# Patient Record
Sex: FEMALE | Race: WHITE | NOT HISPANIC OR LATINO
[De-identification: names, ages, dates, MRNs, and addresses within clinical notes are randomized per-mention and may not be internally consistent; named-entity substitution may affect disease eponyms.]

---

## 2018-02-14 ENCOUNTER — TRANSCRIPTION ENCOUNTER (OUTPATIENT)
Age: 48
End: 2018-02-14

## 2018-02-26 ENCOUNTER — TRANSCRIPTION ENCOUNTER (OUTPATIENT)
Age: 48
End: 2018-02-26

## 2019-02-22 ENCOUNTER — TRANSCRIPTION ENCOUNTER (OUTPATIENT)
Age: 49
End: 2019-02-22

## 2019-03-12 ENCOUNTER — TRANSCRIPTION ENCOUNTER (OUTPATIENT)
Age: 49
End: 2019-03-12

## 2019-11-11 ENCOUNTER — TRANSCRIPTION ENCOUNTER (OUTPATIENT)
Age: 49
End: 2019-11-11

## 2020-07-20 ENCOUNTER — TRANSCRIPTION ENCOUNTER (OUTPATIENT)
Age: 50
End: 2020-07-20

## 2020-07-28 ENCOUNTER — TRANSCRIPTION ENCOUNTER (OUTPATIENT)
Age: 50
End: 2020-07-28

## 2021-02-22 ENCOUNTER — INPATIENT (INPATIENT)
Facility: HOSPITAL | Age: 51
LOS: 3 days | Discharge: HOME | End: 2021-02-26
Attending: INTERNAL MEDICINE | Admitting: INTERNAL MEDICINE
Payer: MEDICAID

## 2021-02-22 VITALS
HEIGHT: 64 IN | HEART RATE: 110 BPM | RESPIRATION RATE: 19 BRPM | OXYGEN SATURATION: 100 % | WEIGHT: 139.99 LBS | DIASTOLIC BLOOD PRESSURE: 41 MMHG | SYSTOLIC BLOOD PRESSURE: 82 MMHG | TEMPERATURE: 95 F

## 2021-02-22 LAB
ALBUMIN SERPL ELPH-MCNC: 2.9 G/DL — LOW (ref 3.3–5)
ALP SERPL-CCNC: 38 U/L — LOW (ref 40–120)
ALT FLD-CCNC: 20 U/L — SIGNIFICANT CHANGE UP (ref 12–78)
AMMONIA BLD-MCNC: 26 UMOL/L — SIGNIFICANT CHANGE UP (ref 11–32)
ANION GAP SERPL CALC-SCNC: 3 MMOL/L — LOW (ref 5–17)
APTT BLD: 25.3 SEC — LOW (ref 27.5–35.5)
AST SERPL-CCNC: 15 U/L — SIGNIFICANT CHANGE UP (ref 15–37)
BASOPHILS # BLD AUTO: 0.08 K/UL — SIGNIFICANT CHANGE UP (ref 0–0.2)
BASOPHILS NFR BLD AUTO: 0.9 % — SIGNIFICANT CHANGE UP (ref 0–2)
BILIRUB SERPL-MCNC: 0.3 MG/DL — SIGNIFICANT CHANGE UP (ref 0.2–1.2)
BUN SERPL-MCNC: 10 MG/DL — SIGNIFICANT CHANGE UP (ref 7–23)
CALCIUM SERPL-MCNC: 7.8 MG/DL — LOW (ref 8.5–10.1)
CHLORIDE SERPL-SCNC: 114 MMOL/L — HIGH (ref 96–108)
CO2 SERPL-SCNC: 27 MMOL/L — SIGNIFICANT CHANGE UP (ref 22–31)
CREAT SERPL-MCNC: 0.74 MG/DL — SIGNIFICANT CHANGE UP (ref 0.5–1.3)
EOSINOPHIL # BLD AUTO: 0.08 K/UL — SIGNIFICANT CHANGE UP (ref 0–0.5)
EOSINOPHIL NFR BLD AUTO: 0.9 % — SIGNIFICANT CHANGE UP (ref 0–6)
ETHANOL SERPL-MCNC: <10 MG/DL — SIGNIFICANT CHANGE UP (ref 0–10)
GLUCOSE BLDC GLUCOMTR-MCNC: 140 MG/DL — HIGH (ref 70–99)
GLUCOSE BLDC GLUCOMTR-MCNC: 179 MG/DL — HIGH (ref 70–99)
GLUCOSE BLDC GLUCOMTR-MCNC: 201 MG/DL — HIGH (ref 70–99)
GLUCOSE BLDC GLUCOMTR-MCNC: 23 MG/DL — CRITICAL LOW (ref 70–99)
GLUCOSE BLDC GLUCOMTR-MCNC: 266 MG/DL — HIGH (ref 70–99)
GLUCOSE BLDC GLUCOMTR-MCNC: 309 MG/DL — HIGH (ref 70–99)
GLUCOSE BLDC GLUCOMTR-MCNC: 40 MG/DL — CRITICAL LOW (ref 70–99)
GLUCOSE BLDC GLUCOMTR-MCNC: 42 MG/DL — CRITICAL LOW (ref 70–99)
GLUCOSE BLDC GLUCOMTR-MCNC: 52 MG/DL — CRITICAL LOW (ref 70–99)
GLUCOSE BLDC GLUCOMTR-MCNC: 81 MG/DL — SIGNIFICANT CHANGE UP (ref 70–99)
GLUCOSE BLDC GLUCOMTR-MCNC: 91 MG/DL — SIGNIFICANT CHANGE UP (ref 70–99)
GLUCOSE SERPL-MCNC: 32 MG/DL — CRITICAL LOW (ref 70–99)
HCG SERPL-ACNC: <1 MIU/ML — SIGNIFICANT CHANGE UP
HCT VFR BLD CALC: 36.6 % — SIGNIFICANT CHANGE UP (ref 34.5–45)
HGB BLD-MCNC: 11.8 G/DL — SIGNIFICANT CHANGE UP (ref 11.5–15.5)
IMM GRANULOCYTES NFR BLD AUTO: 0.2 % — SIGNIFICANT CHANGE UP (ref 0–1.5)
INR BLD: 1.02 RATIO — SIGNIFICANT CHANGE UP (ref 0.88–1.16)
LACTATE SERPL-SCNC: 2 MMOL/L — SIGNIFICANT CHANGE UP (ref 0.7–2)
LACTATE SERPL-SCNC: 2.9 MMOL/L — HIGH (ref 0.7–2)
LYMPHOCYTES # BLD AUTO: 0.97 K/UL — LOW (ref 1–3.3)
LYMPHOCYTES # BLD AUTO: 10.7 % — LOW (ref 13–44)
MAGNESIUM SERPL-MCNC: 1.6 MG/DL — SIGNIFICANT CHANGE UP (ref 1.6–2.6)
MCHC RBC-ENTMCNC: 29.6 PG — SIGNIFICANT CHANGE UP (ref 27–34)
MCHC RBC-ENTMCNC: 32.2 GM/DL — SIGNIFICANT CHANGE UP (ref 32–36)
MCV RBC AUTO: 92 FL — SIGNIFICANT CHANGE UP (ref 80–100)
MONOCYTES # BLD AUTO: 1.01 K/UL — HIGH (ref 0–0.9)
MONOCYTES NFR BLD AUTO: 11.2 % — SIGNIFICANT CHANGE UP (ref 2–14)
NEUTROPHILS # BLD AUTO: 6.88 K/UL — SIGNIFICANT CHANGE UP (ref 1.8–7.4)
NEUTROPHILS NFR BLD AUTO: 76.1 % — SIGNIFICANT CHANGE UP (ref 43–77)
NRBC # BLD: 0 /100 WBCS — SIGNIFICANT CHANGE UP (ref 0–0)
PLATELET # BLD AUTO: 295 K/UL — SIGNIFICANT CHANGE UP (ref 150–400)
POTASSIUM SERPL-MCNC: 3.9 MMOL/L — SIGNIFICANT CHANGE UP (ref 3.5–5.3)
POTASSIUM SERPL-SCNC: 3.9 MMOL/L — SIGNIFICANT CHANGE UP (ref 3.5–5.3)
PROT SERPL-MCNC: 5.9 GM/DL — LOW (ref 6–8.3)
PROTHROM AB SERPL-ACNC: 11.8 SEC — SIGNIFICANT CHANGE UP (ref 10.6–13.6)
RAPID RVP RESULT: SIGNIFICANT CHANGE UP
RBC # BLD: 3.98 M/UL — SIGNIFICANT CHANGE UP (ref 3.8–5.2)
RBC # FLD: 14.3 % — SIGNIFICANT CHANGE UP (ref 10.3–14.5)
SARS-COV-2 RNA SPEC QL NAA+PROBE: SIGNIFICANT CHANGE UP
SODIUM SERPL-SCNC: 144 MMOL/L — SIGNIFICANT CHANGE UP (ref 135–145)
TROPONIN I SERPL-MCNC: <.015 NG/ML — SIGNIFICANT CHANGE UP (ref 0.01–0.04)
WBC # BLD: 9.04 K/UL — SIGNIFICANT CHANGE UP (ref 3.8–10.5)
WBC # FLD AUTO: 9.04 K/UL — SIGNIFICANT CHANGE UP (ref 3.8–10.5)

## 2021-02-22 PROCEDURE — 99291 CRITICAL CARE FIRST HOUR: CPT

## 2021-02-22 PROCEDURE — 93010 ELECTROCARDIOGRAM REPORT: CPT

## 2021-02-22 PROCEDURE — 73030 X-RAY EXAM OF SHOULDER: CPT | Mod: 26,LT

## 2021-02-22 PROCEDURE — 71045 X-RAY EXAM CHEST 1 VIEW: CPT | Mod: 26

## 2021-02-22 PROCEDURE — 99223 1ST HOSP IP/OBS HIGH 75: CPT

## 2021-02-22 PROCEDURE — 70450 CT HEAD/BRAIN W/O DYE: CPT | Mod: 26,MD

## 2021-02-22 RX ORDER — DEXTROSE 50 % IN WATER 50 %
50 SYRINGE (ML) INTRAVENOUS ONCE
Refills: 0 | Status: DISCONTINUED | OUTPATIENT
Start: 2021-02-22 | End: 2021-02-22

## 2021-02-22 RX ORDER — VENLAFAXINE HCL 75 MG
150 CAPSULE, EXT RELEASE 24 HR ORAL DAILY
Refills: 0 | Status: DISCONTINUED | OUTPATIENT
Start: 2021-02-22 | End: 2021-02-26

## 2021-02-22 RX ORDER — SODIUM CHLORIDE 9 MG/ML
1000 INJECTION INTRAMUSCULAR; INTRAVENOUS; SUBCUTANEOUS ONCE
Refills: 0 | Status: COMPLETED | OUTPATIENT
Start: 2021-02-22 | End: 2021-02-22

## 2021-02-22 RX ORDER — DEXTROSE 50 % IN WATER 50 %
25 SYRINGE (ML) INTRAVENOUS ONCE
Refills: 0 | Status: DISCONTINUED | OUTPATIENT
Start: 2021-02-22 | End: 2021-02-26

## 2021-02-22 RX ORDER — THIAMINE MONONITRATE (VIT B1) 100 MG
100 TABLET ORAL ONCE
Refills: 0 | Status: COMPLETED | OUTPATIENT
Start: 2021-02-22 | End: 2021-02-22

## 2021-02-22 RX ORDER — SODIUM CHLORIDE 9 MG/ML
1000 INJECTION INTRAMUSCULAR; INTRAVENOUS; SUBCUTANEOUS ONCE
Refills: 0 | Status: DISCONTINUED | OUTPATIENT
Start: 2021-02-22 | End: 2021-02-22

## 2021-02-22 RX ORDER — LEVOTHYROXINE SODIUM 125 MCG
75 TABLET ORAL DAILY
Refills: 0 | Status: DISCONTINUED | OUTPATIENT
Start: 2021-02-22 | End: 2021-02-26

## 2021-02-22 RX ORDER — DEXTROSE 50 % IN WATER 50 %
50 SYRINGE (ML) INTRAVENOUS ONCE
Refills: 0 | Status: COMPLETED | OUTPATIENT
Start: 2021-02-22 | End: 2021-02-22

## 2021-02-22 RX ORDER — FOLIC ACID 0.8 MG
1 TABLET ORAL ONCE
Refills: 0 | Status: COMPLETED | OUTPATIENT
Start: 2021-02-22 | End: 2021-02-22

## 2021-02-22 RX ORDER — SODIUM CHLORIDE 9 MG/ML
1000 INJECTION, SOLUTION INTRAVENOUS
Refills: 0 | Status: DISCONTINUED | OUTPATIENT
Start: 2021-02-22 | End: 2021-02-26

## 2021-02-22 RX ORDER — INSULIN LISPRO 100/ML
VIAL (ML) SUBCUTANEOUS
Refills: 0 | Status: DISCONTINUED | OUTPATIENT
Start: 2021-02-22 | End: 2021-02-26

## 2021-02-22 RX ORDER — SODIUM CHLORIDE 9 MG/ML
1000 INJECTION INTRAMUSCULAR; INTRAVENOUS; SUBCUTANEOUS
Refills: 0 | Status: DISCONTINUED | OUTPATIENT
Start: 2021-02-22 | End: 2021-02-23

## 2021-02-22 RX ORDER — INSULIN LISPRO 100/ML
VIAL (ML) SUBCUTANEOUS AT BEDTIME
Refills: 0 | Status: DISCONTINUED | OUTPATIENT
Start: 2021-02-22 | End: 2021-02-26

## 2021-02-22 RX ORDER — NICOTINE POLACRILEX 2 MG
1 GUM BUCCAL DAILY
Refills: 0 | Status: DISCONTINUED | OUTPATIENT
Start: 2021-02-22 | End: 2021-02-26

## 2021-02-22 RX ORDER — DEXTROSE 50 % IN WATER 50 %
100 SYRINGE (ML) INTRAVENOUS ONCE
Refills: 0 | Status: COMPLETED | OUTPATIENT
Start: 2021-02-22 | End: 2021-02-22

## 2021-02-22 RX ORDER — NICOTINE POLACRILEX 2 MG
4 GUM BUCCAL
Refills: 0 | Status: DISCONTINUED | OUTPATIENT
Start: 2021-02-22 | End: 2021-02-26

## 2021-02-22 RX ORDER — MAGNESIUM HYDROXIDE 400 MG/1
30 TABLET, CHEWABLE ORAL DAILY
Refills: 0 | Status: DISCONTINUED | OUTPATIENT
Start: 2021-02-22 | End: 2021-02-26

## 2021-02-22 RX ORDER — NICOTINE POLACRILEX 2 MG
4 GUM BUCCAL
Refills: 0 | Status: DISCONTINUED | OUTPATIENT
Start: 2021-02-22 | End: 2021-02-22

## 2021-02-22 RX ORDER — CALCIUM CARBONATE 500(1250)
2 TABLET ORAL
Refills: 0 | Status: DISCONTINUED | OUTPATIENT
Start: 2021-02-22 | End: 2021-02-26

## 2021-02-22 RX ORDER — DOXAZOSIN MESYLATE 4 MG
2 TABLET ORAL AT BEDTIME
Refills: 0 | Status: DISCONTINUED | OUTPATIENT
Start: 2021-02-22 | End: 2021-02-26

## 2021-02-22 RX ORDER — GABAPENTIN 400 MG/1
300 CAPSULE ORAL THREE TIMES A DAY
Refills: 0 | Status: DISCONTINUED | OUTPATIENT
Start: 2021-02-22 | End: 2021-02-26

## 2021-02-22 RX ORDER — GLUCAGON INJECTION, SOLUTION 0.5 MG/.1ML
1 INJECTION, SOLUTION SUBCUTANEOUS ONCE
Refills: 0 | Status: DISCONTINUED | OUTPATIENT
Start: 2021-02-22 | End: 2021-02-26

## 2021-02-22 RX ORDER — DEXTROSE 50 % IN WATER 50 %
15 SYRINGE (ML) INTRAVENOUS ONCE
Refills: 0 | Status: DISCONTINUED | OUTPATIENT
Start: 2021-02-22 | End: 2021-02-26

## 2021-02-22 RX ORDER — QUETIAPINE FUMARATE 200 MG/1
50 TABLET, FILM COATED ORAL AT BEDTIME
Refills: 0 | Status: DISCONTINUED | OUTPATIENT
Start: 2021-02-22 | End: 2021-02-26

## 2021-02-22 RX ORDER — INSULIN GLARGINE 100 [IU]/ML
12 INJECTION, SOLUTION SUBCUTANEOUS AT BEDTIME
Refills: 0 | Status: DISCONTINUED | OUTPATIENT
Start: 2021-02-22 | End: 2021-02-26

## 2021-02-22 RX ORDER — SODIUM CHLORIDE 9 MG/ML
1000 INJECTION, SOLUTION INTRAVENOUS
Refills: 0 | Status: DISCONTINUED | OUTPATIENT
Start: 2021-02-22 | End: 2021-02-22

## 2021-02-22 RX ORDER — SODIUM CHLORIDE 9 MG/ML
2000 INJECTION INTRAMUSCULAR; INTRAVENOUS; SUBCUTANEOUS ONCE
Refills: 0 | Status: COMPLETED | OUTPATIENT
Start: 2021-02-22 | End: 2021-02-22

## 2021-02-22 RX ORDER — SODIUM CHLORIDE 9 MG/ML
1000 INJECTION, SOLUTION INTRAVENOUS
Refills: 0 | Status: COMPLETED | OUTPATIENT
Start: 2021-02-22 | End: 2021-02-22

## 2021-02-22 RX ORDER — DEXTROSE 50 % IN WATER 50 %
12.5 SYRINGE (ML) INTRAVENOUS ONCE
Refills: 0 | Status: DISCONTINUED | OUTPATIENT
Start: 2021-02-22 | End: 2021-02-26

## 2021-02-22 RX ORDER — INSULIN LISPRO 100/ML
4 VIAL (ML) SUBCUTANEOUS
Refills: 0 | Status: DISCONTINUED | OUTPATIENT
Start: 2021-02-22 | End: 2021-02-26

## 2021-02-22 RX ADMIN — Medication 2 MILLIGRAM(S): at 22:09

## 2021-02-22 RX ADMIN — SODIUM CHLORIDE 1000 MILLILITER(S): 9 INJECTION, SOLUTION INTRAVENOUS at 13:42

## 2021-02-22 RX ADMIN — Medication 4 MILLIGRAM(S): at 22:09

## 2021-02-22 RX ADMIN — SODIUM CHLORIDE 1000 MILLILITER(S): 9 INJECTION INTRAMUSCULAR; INTRAVENOUS; SUBCUTANEOUS at 11:34

## 2021-02-22 RX ADMIN — Medication 150 MILLIGRAM(S): at 17:33

## 2021-02-22 RX ADMIN — GABAPENTIN 300 MILLIGRAM(S): 400 CAPSULE ORAL at 22:08

## 2021-02-22 RX ADMIN — QUETIAPINE FUMARATE 50 MILLIGRAM(S): 200 TABLET, FILM COATED ORAL at 22:08

## 2021-02-22 RX ADMIN — SODIUM CHLORIDE 100 MILLILITER(S): 9 INJECTION INTRAMUSCULAR; INTRAVENOUS; SUBCUTANEOUS at 15:11

## 2021-02-22 RX ADMIN — SODIUM CHLORIDE 2000 MILLILITER(S): 9 INJECTION INTRAMUSCULAR; INTRAVENOUS; SUBCUTANEOUS at 09:22

## 2021-02-22 RX ADMIN — Medication 8: at 15:49

## 2021-02-22 RX ADMIN — Medication 4 MILLIGRAM(S): at 17:58

## 2021-02-22 RX ADMIN — MAGNESIUM HYDROXIDE 30 MILLILITER(S): 400 TABLET, CHEWABLE ORAL at 22:08

## 2021-02-22 RX ADMIN — Medication 50 MILLILITER(S): at 08:18

## 2021-02-22 RX ADMIN — INSULIN GLARGINE 12 UNIT(S): 100 INJECTION, SOLUTION SUBCUTANEOUS at 22:33

## 2021-02-22 RX ADMIN — Medication 1 PATCH: at 19:54

## 2021-02-22 RX ADMIN — SODIUM CHLORIDE 70 MILLILITER(S): 9 INJECTION, SOLUTION INTRAVENOUS at 10:05

## 2021-02-22 RX ADMIN — Medication 1 MILLIGRAM(S): at 11:40

## 2021-02-22 RX ADMIN — SODIUM CHLORIDE 1000 MILLILITER(S): 9 INJECTION INTRAMUSCULAR; INTRAVENOUS; SUBCUTANEOUS at 10:05

## 2021-02-22 RX ADMIN — Medication 1 PATCH: at 14:55

## 2021-02-22 RX ADMIN — Medication 2 TABLET(S): at 18:05

## 2021-02-22 RX ADMIN — Medication 100 MILLILITER(S): at 09:59

## 2021-02-22 RX ADMIN — Medication 100 MILLIGRAM(S): at 11:40

## 2021-02-22 RX ADMIN — Medication 4 UNIT(S): at 15:50

## 2021-02-22 NOTE — ED PROVIDER NOTE - CONSTITUTIONAL, MLM
Well appearing, awake, alert, oriented to person, place, time/situation and in no apparent distress. Speaking in clear full sentences no nasal flaring no shoulders retractions no diaphoresis appears very comfortable sitting up in the stretcher in a bright light room normal...

## 2021-02-22 NOTE — ED ADULT NURSE REASSESSMENT NOTE - NS ED NURSE REASSESS COMMENT FT1
Pt BG critical as per flow. Dr. Sanchez made aware. D5 started as per flow. 2 amps OF d50 given. BQ q1hr. Repeat 179 at this time. MD Sanchez aware.

## 2021-02-22 NOTE — H&P ADULT - NSICDXPASTMEDICALHX_GEN_ALL_CORE_FT
PAST MEDICAL HISTORY:  DM (diabetes mellitus)     ETOH abuse     Hypothyroid     Raynaud disease

## 2021-02-22 NOTE — ED ADULT NURSE REASSESSMENT NOTE - NS ED NURSE REASSESS COMMENT FT1
Pt lying in bed comfortably. Updated on POC. Report given to Marline Delgadillo RN on 1D. Awaiting transportation at this time.
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
## 2021-02-22 NOTE — ED PROVIDER NOTE - PROGRESS NOTE DETAILS
ICU is notified. ICU attending Dr. Little is here eval and sts pt does not need icu admission. Dr. Benson is notified to admit pt.

## 2021-02-22 NOTE — ED PROVIDER NOTE - OBJECTIVE STATEMENT
50 years old female by ems sts pt was unresponsive with blood sugar of 30 pt received D 10 normal saline pt 's blood sugar at triage is 23 pt received dextro 50 1 amp iv pt then slowly woke up Pt is alert and oriented x 3 now sts admit drinking alcohol yesterday and did not have dinner last meal was 17:00 am pt has a hx of diabetes she sts she received Lantus 10 u this morning. Pt also c/o left shoulder pain increases with left arm movement for couple of weakness but denies trauma. Pt denies headache, dizziness, blurred visions, trauma to the head, light sensitivities, focal/distal weakness or numbness, neck/back/hips pain, cough, sob, chest pain, nausea, vomiting, fever, chills, abd pain, dysuria or irregular bowel movements.

## 2021-02-22 NOTE — ED PROVIDER NOTE - CRITICAL CARE ATTENDING CONTRIBUTION TO CARE
Pt was hypoglycemia hypotensive with alcohol withdrow. Pt was given multiple fluid but at same time the fluid makes hypoglycemia persist ICU attending was notified and eval pt.

## 2021-02-22 NOTE — ED ADULT NURSE NOTE - CHIEF COMPLAINT QUOTE
ems states, " pt with low poc in field , 20g TO left hand , d10 infusion started , poc went up to 114 , hx of etoh abuse, psych and hyperthyroidism " in traige poc 23, md echevarria  made aware , pt talking, d50 Iv  given as ordered '
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
## 2021-02-22 NOTE — H&P ADULT - HISTORY OF PRESENT ILLNESS
50 years old female brought by ems as pt was unresponsive with blood sugar of 30. Patient received D 10 normal saline pt 's blood sugar at triage is 23 pt received dextro 50 1 amp iv pt then slowly woke up Pt is alert and oriented x 3. In ER, patient admits to drinking alcohol yesterday and did not have dinner last meal was 17:00 am pt has a hx of diabetes. She received Lantus 10 u this morning. Pt also c/o left shoulder pain increases with left arm movement for couple of weakness but denies trauma. Pt denies headache, dizziness, blurred visions, trauma to the head, light sensitivities, focal/distal weakness or numbness, neck/back/hips pain, cough, sob, chest pain, nausea, vomiting. Admitted for IV glucose and ETOH withdrawal.  50 years old female Type I DM brought by ems as pt was unresponsive with blood sugar of 30. Patient received D 10 normal saline pt 's blood sugar at triage is 23 pt received dextro 50 1 amp iv pt then slowly woke up Pt is alert and oriented x 3. In ER, patient admits to drinking alcohol yesterday and did not have dinner last meal was 17:00 am pt has a hx of diabetes. She received Lantus 10 u this morning. Pt denies headache, dizziness, blurred visions, trauma to the head, light sensitivities, focal/distal weakness or numbness, neck/back/hips pain, cough, sob, chest pain, nausea, vomiting.     Admitted for IV glucose and blood sugar management.  50 years old female Type I DM brought by ems as pt was unresponsive with blood sugar of 30. Patient received D 10 normal saline pt 's blood sugar at triage is 23 pt received dextro 50 1 amp iv pt then slowly woke up Pt is alert and oriented x 3.     In ER, patient admits to drinking alcohol yesterday and did not have dinner last meal was 17:00. Has a hx of Type I diabetes. She received no Lantus this AM only Humolog. Pt denies headache, dizziness, blurred visions, trauma to the head, light sensitivities, focal/distal weakness or numbness, neck/back/hips pain, cough, sob, chest pain, nausea, vomiting.     Admitted for IV glucose and blood sugar management.

## 2021-02-22 NOTE — H&P ADULT - ASSESSMENT
50 years old female brought by ems as pt was unresponsive with blood sugar of 30. Patient received D 10 normal saline pt 's blood sugar at triage is 23 pt received dextro 50 1 amp iv pt then slowly woke up Pt is alert and oriented x 3. In ER, patient admits to drinking alcohol yesterday and did not have dinner last meal was 17:00 am pt has a hx of diabetes. She received Lantus 10 u this morning. Pt also c/o left shoulder pain increases with left arm movement for couple of weakness but denies trauma. Pt denies headache, dizziness, blurred visions, trauma to the head, light sensitivities, focal/distal weakness or numbness, neck/back/hips pain, cough, sob, chest pain, nausea, vomiting. Admitted for IV glucose and ETOH withdrawal.  50 years old female brought by ems as pt was unresponsive with blood sugar of 30. Patient received D 10 normal saline pt 's blood sugar at triage is 23 pt received dextro 50 1 amp iv pt then slowly woke up Pt is alert and oriented x 3. In ER, patient admits to drinking alcohol yesterday and did not have dinner last meal was 17:00 am pt has a hx of diabetes. She received Lantus 10 u this morning. Pt also c/o left shoulder pain increases with left arm movement for couple of weakness but denies trauma. Pt denies headache, dizziness, blurred visions, trauma to the head, light sensitivities, focal/distal weakness or numbness, neck/back/hips pain, cough, sob, chest pain, nausea, vomiting.     Plan: Will resume Lantus 12 units at night and Admelog 4 units tid, finger sticks AC and HS. IVF for 24 hours with NS. Repeat BS on 1 D was around 250.   Anion gap normal, lactate normal, blood ETOH level normal. States last used Vodka 1.5 months ago, did at one point drink 1.5 bottles a day.          Psych: Continue PTSD meds of Seraquel 50 mg at night, Prazosin 2 mg at night and Effexor 150 mg/day.  Social work consult   was requested as does not have home in area.    50 years old female brought by ems as pt was unresponsive with blood sugar of 30. Patient received D 10 normal saline pt 's blood sugar at triage is 23 pt received dextro 50 1 amp iv pt then slowly woke up Pt is alert and oriented x 3. In ER, patient admits to drinking alcohol yesterday and did not have dinner last meal was 17:00 am pt has a hx of diabetes. She received Lantus 10 u this morning. Pt also c/o left shoulder pain increases with left arm movement for couple of weakness but denies trauma. Pt denies headache, dizziness, blurred visions, trauma to the head, light sensitivities, focal/distal weakness or numbness, neck/back/hips pain, cough, sob, chest pain, nausea, vomiting.     Plan: Will resume Lantus 12 units at night and Admelog 4 units tid, finger sticks AC and HS. IVF for 24 hours with NS. Repeat BS on 1-D was around 250.   Anion gap normal at 3, lactate normal < 2, blood ETOH level normal. States last used daily Vodka 1.5 months ago, did at one point drink 1.5 bottles a day.          Psych: Continue PTSD meds of Seraquel 50 mg at night, Prazosin 2 mg at night and Effexor 150 mg/day.  Social work consult   was requested as does not have home in area.    50 years old female brought by ems as pt was unresponsive with blood sugar of 30. Patient received D 10 normal saline pt 's blood sugar at triage is 23 pt received dextro 50 1 amp iv pt then slowly woke up Pt is alert and oriented x 3. In ER, patient admits to drinking alcohol yesterday and did not have dinner last meal was 17:00 am pt has a hx of diabetes.      Plan: Will resume Lantus 12 units at night and Admelog 4 units tid, finger sticks AC and HS. IVF for 24 hours with NS. Repeat BS on 1-D was around 250.   Anion gap normal at 3, lactate normal < 2, blood ETOH level normal. States last used daily Vodka 1.5 months ago, did at one point drink 1.5 bottles a day.          Psych: Continue PTSD meds of Seraquel 50 mg at night, Prazosin 2 mg at night and Effexor 150 mg/day.  Social work consult   was requested as does not have home in area.

## 2021-02-22 NOTE — H&P ADULT - NSHPPHYSICALEXAM_GEN_ALL_CORE
PHYSICAL EXAMINATION:  Vital Signs Last 24 Hrs  T(C): 36.2 (22 Feb 2021 10:37), Max: 36.2 (22 Feb 2021 10:37)  T(F): 97.2 (22 Feb 2021 10:37), Max: 97.2 (22 Feb 2021 10:37)  HR: 86 (22 Feb 2021 12:23) (84 - 110)  BP: 103/64 (22 Feb 2021 12:23) (82/41 - 103/64)  BP(mean): --  RR: 21 (22 Feb 2021 12:23) (18 - 21)  SpO2: 98% (22 Feb 2021 12:23) (96% - 100%)  CAPILLARY BLOOD GLUCOSE      POCT Blood Glucose.: 201 mg/dL (22 Feb 2021 11:56)  POCT Blood Glucose.: 179 mg/dL (22 Feb 2021 10:44)  POCT Blood Glucose.: 40 mg/dL (22 Feb 2021 09:53)  POCT Blood Glucose.: 52 mg/dL (22 Feb 2021 09:47)  POCT Blood Glucose.: 81 mg/dL (22 Feb 2021 08:40)  POCT Blood Glucose.: 42 mg/dL (22 Feb 2021 08:13)  POCT Blood Glucose.: 23 mg/dL (22 Feb 2021 08:09)      GENERAL: NAD, well-groomed, well-developed  HEAD:  atraumatic, normocephalic  EYES: sclera anicteric  ENMT: mucous membranes moist  NECK: supple, No JVD  CHEST/LUNG: clear to auscultation bilaterally; no rales, rhonchi, or wheezing b/l  HEART: normal S1, S2  ABDOMEN: BS+, soft, ND, NT   EXTREMITIES:  pulses palpable; no clubbing, cyanosis, or edema b/l LEs  NEURO: awake, alert, interactive; moves all extremities  SKIN: no rashes or lesions PHYSICAL EXAMINATION:  Vital Signs Last 24 Hrs  T(C): 36.2 (22 Feb 2021 10:37), Max: 36.2 (22 Feb 2021 10:37)  T(F): 97.2 (22 Feb 2021 10:37), Max: 97.2 (22 Feb 2021 10:37)  HR: 86 (22 Feb 2021 12:23) (84 - 110)  BP: 103/64 (22 Feb 2021 12:23) (82/41 - 103/64)  BP(mean): --  RR: 21 (22 Feb 2021 12:23) (18 - 21)  SpO2: 98% (22 Feb 2021 12:23) (96% - 100%)  CAPILLARY BLOOD GLUCOSE      POCT Blood Glucose.: 201 mg/dL (22 Feb 2021 11:56)  POCT Blood Glucose.: 179 mg/dL (22 Feb 2021 10:44)  POCT Blood Glucose.: 40 mg/dL (22 Feb 2021 09:53)  POCT Blood Glucose.: 52 mg/dL (22 Feb 2021 09:47)  POCT Blood Glucose.: 81 mg/dL (22 Feb 2021 08:40)  POCT Blood Glucose.: 42 mg/dL (22 Feb 2021 08:13)  POCT Blood Glucose.: 23 mg/dL (22 Feb 2021 08:09)      GENERAL: NAD, well-groomed, well-developed, seen in ER, alert, comfortable, not anxious  HEAD:  atraumatic, normocephalic  EYES: sclera anicteric  ENMT: mucous membranes moist  NECK: supple, No JVD  CHEST/LUNG: clear to auscultation bilaterally; no rales, rhonchi, or wheezing b/l  HEART: normal S1, S2  ABDOMEN: BS+, soft, ND, NT   EXTREMITIES:  pulses palpable; no clubbing, cyanosis, or edema b/l LEs  NEURO: awake, alert, interactive; moves all extremities  SKIN: no rashes or lesions

## 2021-02-22 NOTE — ED PROVIDER NOTE - MUSCULOSKELETAL NECK EXAM
no vertebral point tenderness no pain on movement/no deformity, pain or tenderness. no restriction of movement/supple/trachea midline

## 2021-02-22 NOTE — PATIENT PROFILE ADULT - HAVE YOU EXPERIENCED VIOLENCE OR A TRAUMATIC EVENT?
Gabapentin      Last Written Prescription Date:  11/08/2017  Last Fill Quantity: 30,   # refills: 5  Last Office Visit: 5/15/2018  Future Office visit:       Routing refill request to provider for review/approval because:  Drug not on the [facility], [facility] or [facility] refill protocol or controlled substance    
no

## 2021-02-22 NOTE — ED ADULT NURSE NOTE - NSIMPLEMENTINTERV_GEN_ALL_ED
Implemented All Fall with Harm Risk Interventions:  [place] to call system. Call bell, personal items and telephone within reach. Instruct patient to call for assistance. Room bathroom lighting operational. Non-slip footwear when patient is off stretcher. Physically safe environment: no spills, clutter or unnecessary equipment. Stretcher in lowest position, wheels locked, appropriate side rails in place. Provide visual cue, wrist band, yellow gown, etc. Monitor gait and stability. Monitor for mental status changes and reorient to person, place, and time. Review medications for side effects contributing to fall risk. Reinforce activity limits and safety measures with patient and family. Provide visual clues: red socks.

## 2021-02-22 NOTE — H&P ADULT - NSHPLABSRESULTS_GEN_ALL_CORE
LABS:                        11.8   9.04  )-----------( 295      ( 22 Feb 2021 09:25 )             36.6     02-22    144  |  114<H>  |  10  ----------------------------<  32<LL>  3.9   |  27  |  0.74    Ca    7.8<L>      22 Feb 2021 10:36  Mg     1.6     02-22    TPro  5.9<L>  /  Alb  2.9<L>  /  TBili  0.3  /  DBili  x   /  AST  15  /  ALT  20  /  AlkPhos  38<L>  02-22    PT/INR - ( 22 Feb 2021 09:25 )   PT: 11.8 sec;   INR: 1.02 ratio         PTT - ( 22 Feb 2021 09:25 )  PTT:25.3 sec        RADIOLOGY & ADDITIONAL TESTS:

## 2021-02-22 NOTE — ED ADULT NURSE NOTE - OBJECTIVE STATEMENT
Pt c/o low BG. As per EMS BG below 30. D10 started in the field and repeat in the field 114. Upon arrival BG 23, tremors upon arrival. Pt has h/o ETOH and PTSD. H/o hyperthyroid, depression and DM. As per friend she was drinking last night. Allergy to Ceftrizone. Pt c/o low BG. As per EMS BG below 30. D10 started in the field and repeat in the field 114. Upon arrival BG 23, tremors upon arrival. Pt has h/o ETOH and PTSD. H/o hyperthyroid, depression and DM. As per friend she was drinking last night. Allergy to Ceftrizone. Pt states feeling hungry at this time. CIWA in chart. Denies N/V.

## 2021-02-23 LAB
A1C WITH ESTIMATED AVERAGE GLUCOSE RESULT: 7.8 % — HIGH (ref 4–5.6)
ALBUMIN SERPL ELPH-MCNC: 3 G/DL — LOW (ref 3.3–5)
ALP SERPL-CCNC: 47 U/L — SIGNIFICANT CHANGE UP (ref 40–120)
ALT FLD-CCNC: 23 U/L — SIGNIFICANT CHANGE UP (ref 12–78)
AMPHET UR-MCNC: NEGATIVE — SIGNIFICANT CHANGE UP
ANION GAP SERPL CALC-SCNC: 4 MMOL/L — LOW (ref 5–17)
AST SERPL-CCNC: 22 U/L — SIGNIFICANT CHANGE UP (ref 15–37)
BARBITURATES UR SCN-MCNC: NEGATIVE — SIGNIFICANT CHANGE UP
BENZODIAZ UR-MCNC: NEGATIVE — SIGNIFICANT CHANGE UP
BILIRUB SERPL-MCNC: 0.4 MG/DL — SIGNIFICANT CHANGE UP (ref 0.2–1.2)
BUN SERPL-MCNC: 7 MG/DL — SIGNIFICANT CHANGE UP (ref 7–23)
CALCIUM SERPL-MCNC: 8.3 MG/DL — LOW (ref 8.5–10.1)
CHLORIDE SERPL-SCNC: 110 MMOL/L — HIGH (ref 96–108)
CO2 SERPL-SCNC: 27 MMOL/L — SIGNIFICANT CHANGE UP (ref 22–31)
COCAINE METAB.OTHER UR-MCNC: NEGATIVE — SIGNIFICANT CHANGE UP
CREAT SERPL-MCNC: 0.83 MG/DL — SIGNIFICANT CHANGE UP (ref 0.5–1.3)
ESTIMATED AVERAGE GLUCOSE: 177 MG/DL — HIGH (ref 68–114)
GLUCOSE BLDC GLUCOMTR-MCNC: 154 MG/DL — HIGH (ref 70–99)
GLUCOSE BLDC GLUCOMTR-MCNC: 173 MG/DL — HIGH (ref 70–99)
GLUCOSE BLDC GLUCOMTR-MCNC: 190 MG/DL — HIGH (ref 70–99)
GLUCOSE BLDC GLUCOMTR-MCNC: 78 MG/DL — SIGNIFICANT CHANGE UP (ref 70–99)
GLUCOSE SERPL-MCNC: 109 MG/DL — HIGH (ref 70–99)
HCT VFR BLD CALC: 36.4 % — SIGNIFICANT CHANGE UP (ref 34.5–45)
HGB BLD-MCNC: 11.7 G/DL — SIGNIFICANT CHANGE UP (ref 11.5–15.5)
LACTATE SERPL-SCNC: 2 MMOL/L — SIGNIFICANT CHANGE UP (ref 0.7–2)
MAGNESIUM SERPL-MCNC: 1.6 MG/DL — SIGNIFICANT CHANGE UP (ref 1.6–2.6)
MCHC RBC-ENTMCNC: 29.4 PG — SIGNIFICANT CHANGE UP (ref 27–34)
MCHC RBC-ENTMCNC: 32.1 GM/DL — SIGNIFICANT CHANGE UP (ref 32–36)
MCV RBC AUTO: 91.5 FL — SIGNIFICANT CHANGE UP (ref 80–100)
METHADONE UR-MCNC: NEGATIVE — SIGNIFICANT CHANGE UP
NRBC # BLD: 0 /100 WBCS — SIGNIFICANT CHANGE UP (ref 0–0)
OPIATES UR-MCNC: NEGATIVE — SIGNIFICANT CHANGE UP
PCP SPEC-MCNC: SIGNIFICANT CHANGE UP
PCP UR-MCNC: NEGATIVE — SIGNIFICANT CHANGE UP
PHOSPHATE SERPL-MCNC: 2.7 MG/DL — SIGNIFICANT CHANGE UP (ref 2.5–4.5)
PLATELET # BLD AUTO: 326 K/UL — SIGNIFICANT CHANGE UP (ref 150–400)
POTASSIUM SERPL-MCNC: 4.4 MMOL/L — SIGNIFICANT CHANGE UP (ref 3.5–5.3)
POTASSIUM SERPL-SCNC: 4.4 MMOL/L — SIGNIFICANT CHANGE UP (ref 3.5–5.3)
PROT SERPL-MCNC: 6.1 GM/DL — SIGNIFICANT CHANGE UP (ref 6–8.3)
RBC # BLD: 3.98 M/UL — SIGNIFICANT CHANGE UP (ref 3.8–5.2)
RBC # FLD: 14.2 % — SIGNIFICANT CHANGE UP (ref 10.3–14.5)
SARS-COV-2 IGG SERPL QL IA: NEGATIVE — SIGNIFICANT CHANGE UP
SARS-COV-2 IGM SERPL IA-ACNC: <0.1 INDEX — SIGNIFICANT CHANGE UP
SODIUM SERPL-SCNC: 141 MMOL/L — SIGNIFICANT CHANGE UP (ref 135–145)
THC UR QL: NEGATIVE — SIGNIFICANT CHANGE UP
TSH SERPL-MCNC: 0.97 UU/ML — SIGNIFICANT CHANGE UP (ref 0.36–3.74)
WBC # BLD: 5.43 K/UL — SIGNIFICANT CHANGE UP (ref 3.8–10.5)
WBC # FLD AUTO: 5.43 K/UL — SIGNIFICANT CHANGE UP (ref 3.8–10.5)

## 2021-02-23 PROCEDURE — 99232 SBSQ HOSP IP/OBS MODERATE 35: CPT

## 2021-02-23 RX ORDER — LEVOTHYROXINE SODIUM 125 MCG
1 TABLET ORAL
Qty: 0 | Refills: 0 | DISCHARGE

## 2021-02-23 RX ORDER — GABAPENTIN 400 MG/1
0 CAPSULE ORAL
Qty: 0 | Refills: 0 | DISCHARGE

## 2021-02-23 RX ORDER — PRAZOSIN HCL 2 MG
1 CAPSULE ORAL
Qty: 0 | Refills: 0 | DISCHARGE

## 2021-02-23 RX ORDER — ENOXAPARIN SODIUM 100 MG/ML
40 INJECTION SUBCUTANEOUS AT BEDTIME
Refills: 0 | Status: DISCONTINUED | OUTPATIENT
Start: 2021-02-23 | End: 2021-02-26

## 2021-02-23 RX ORDER — VENLAFAXINE HCL 75 MG
185 CAPSULE, EXT RELEASE 24 HR ORAL
Qty: 0 | Refills: 0 | DISCHARGE

## 2021-02-23 RX ORDER — QUETIAPINE FUMARATE 200 MG/1
0 TABLET, FILM COATED ORAL
Qty: 0 | Refills: 0 | DISCHARGE

## 2021-02-23 RX ADMIN — Medication 1 PATCH: at 14:39

## 2021-02-23 RX ADMIN — Medication 4 UNIT(S): at 16:43

## 2021-02-23 RX ADMIN — SODIUM CHLORIDE 100 MILLILITER(S): 9 INJECTION INTRAMUSCULAR; INTRAVENOUS; SUBCUTANEOUS at 16:27

## 2021-02-23 RX ADMIN — Medication 4 UNIT(S): at 08:13

## 2021-02-23 RX ADMIN — Medication 4 MILLIGRAM(S): at 12:16

## 2021-02-23 RX ADMIN — Medication 75 MICROGRAM(S): at 05:17

## 2021-02-23 RX ADMIN — Medication 4 UNIT(S): at 11:29

## 2021-02-23 RX ADMIN — Medication 1 PATCH: at 06:35

## 2021-02-23 RX ADMIN — Medication 1 PATCH: at 11:25

## 2021-02-23 RX ADMIN — Medication 150 MILLIGRAM(S): at 11:24

## 2021-02-23 RX ADMIN — QUETIAPINE FUMARATE 50 MILLIGRAM(S): 200 TABLET, FILM COATED ORAL at 22:07

## 2021-02-23 RX ADMIN — Medication 4 MILLIGRAM(S): at 16:42

## 2021-02-23 RX ADMIN — Medication 2 MILLIGRAM(S): at 22:07

## 2021-02-23 RX ADMIN — GABAPENTIN 300 MILLIGRAM(S): 400 CAPSULE ORAL at 05:17

## 2021-02-23 RX ADMIN — SODIUM CHLORIDE 100 MILLILITER(S): 9 INJECTION INTRAMUSCULAR; INTRAVENOUS; SUBCUTANEOUS at 00:31

## 2021-02-23 RX ADMIN — ENOXAPARIN SODIUM 40 MILLIGRAM(S): 100 INJECTION SUBCUTANEOUS at 22:07

## 2021-02-23 RX ADMIN — GABAPENTIN 300 MILLIGRAM(S): 400 CAPSULE ORAL at 14:00

## 2021-02-23 RX ADMIN — Medication 2: at 16:42

## 2021-02-23 RX ADMIN — Medication 1 PATCH: at 19:30

## 2021-02-23 RX ADMIN — MAGNESIUM HYDROXIDE 30 MILLILITER(S): 400 TABLET, CHEWABLE ORAL at 22:42

## 2021-02-23 RX ADMIN — Medication 2: at 08:13

## 2021-02-23 RX ADMIN — INSULIN GLARGINE 12 UNIT(S): 100 INJECTION, SOLUTION SUBCUTANEOUS at 22:07

## 2021-02-23 RX ADMIN — GABAPENTIN 300 MILLIGRAM(S): 400 CAPSULE ORAL at 22:07
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
## 2021-02-24 DIAGNOSIS — E16.2 HYPOGLYCEMIA, UNSPECIFIED: ICD-10-CM

## 2021-02-24 DIAGNOSIS — E03.9 HYPOTHYROIDISM, UNSPECIFIED: ICD-10-CM

## 2021-02-24 LAB
ALBUMIN SERPL ELPH-MCNC: 2.9 G/DL — LOW (ref 3.3–5)
ALP SERPL-CCNC: 46 U/L — SIGNIFICANT CHANGE UP (ref 40–120)
ALT FLD-CCNC: 21 U/L — SIGNIFICANT CHANGE UP (ref 12–78)
ANION GAP SERPL CALC-SCNC: 5 MMOL/L — SIGNIFICANT CHANGE UP (ref 5–17)
AST SERPL-CCNC: 17 U/L — SIGNIFICANT CHANGE UP (ref 15–37)
BILIRUB SERPL-MCNC: 0.4 MG/DL — SIGNIFICANT CHANGE UP (ref 0.2–1.2)
BUN SERPL-MCNC: 11 MG/DL — SIGNIFICANT CHANGE UP (ref 7–23)
CALCIUM SERPL-MCNC: 8.5 MG/DL — SIGNIFICANT CHANGE UP (ref 8.5–10.1)
CHLORIDE SERPL-SCNC: 109 MMOL/L — HIGH (ref 96–108)
CO2 SERPL-SCNC: 29 MMOL/L — SIGNIFICANT CHANGE UP (ref 22–31)
CREAT SERPL-MCNC: 0.74 MG/DL — SIGNIFICANT CHANGE UP (ref 0.5–1.3)
GLUCOSE BLDC GLUCOMTR-MCNC: 123 MG/DL — HIGH (ref 70–99)
GLUCOSE BLDC GLUCOMTR-MCNC: 134 MG/DL — HIGH (ref 70–99)
GLUCOSE BLDC GLUCOMTR-MCNC: 146 MG/DL — HIGH (ref 70–99)
GLUCOSE BLDC GLUCOMTR-MCNC: 257 MG/DL — HIGH (ref 70–99)
GLUCOSE SERPL-MCNC: 133 MG/DL — HIGH (ref 70–99)
HCT VFR BLD CALC: 36.3 % — SIGNIFICANT CHANGE UP (ref 34.5–45)
HGB BLD-MCNC: 11.8 G/DL — SIGNIFICANT CHANGE UP (ref 11.5–15.5)
LACTATE SERPL-SCNC: 0.5 MMOL/L — LOW (ref 0.7–2)
MAGNESIUM SERPL-MCNC: 1.9 MG/DL — SIGNIFICANT CHANGE UP (ref 1.6–2.6)
MCHC RBC-ENTMCNC: 29.7 PG — SIGNIFICANT CHANGE UP (ref 27–34)
MCHC RBC-ENTMCNC: 32.5 GM/DL — SIGNIFICANT CHANGE UP (ref 32–36)
MCV RBC AUTO: 91.4 FL — SIGNIFICANT CHANGE UP (ref 80–100)
NRBC # BLD: 0 /100 WBCS — SIGNIFICANT CHANGE UP (ref 0–0)
PHOSPHATE SERPL-MCNC: 3.9 MG/DL — SIGNIFICANT CHANGE UP (ref 2.5–4.5)
PLATELET # BLD AUTO: 308 K/UL — SIGNIFICANT CHANGE UP (ref 150–400)
POTASSIUM SERPL-MCNC: 3.8 MMOL/L — SIGNIFICANT CHANGE UP (ref 3.5–5.3)
POTASSIUM SERPL-SCNC: 3.8 MMOL/L — SIGNIFICANT CHANGE UP (ref 3.5–5.3)
PROT SERPL-MCNC: 6 GM/DL — SIGNIFICANT CHANGE UP (ref 6–8.3)
RBC # BLD: 3.97 M/UL — SIGNIFICANT CHANGE UP (ref 3.8–5.2)
RBC # FLD: 14.2 % — SIGNIFICANT CHANGE UP (ref 10.3–14.5)
SODIUM SERPL-SCNC: 143 MMOL/L — SIGNIFICANT CHANGE UP (ref 135–145)
WBC # BLD: 4.59 K/UL — SIGNIFICANT CHANGE UP (ref 3.8–10.5)
WBC # FLD AUTO: 4.59 K/UL — SIGNIFICANT CHANGE UP (ref 3.8–10.5)

## 2021-02-24 PROCEDURE — 93010 ELECTROCARDIOGRAM REPORT: CPT

## 2021-02-24 PROCEDURE — 99232 SBSQ HOSP IP/OBS MODERATE 35: CPT

## 2021-02-24 RX ADMIN — Medication 4 MILLIGRAM(S): at 11:21

## 2021-02-24 RX ADMIN — INSULIN GLARGINE 12 UNIT(S): 100 INJECTION, SOLUTION SUBCUTANEOUS at 20:36

## 2021-02-24 RX ADMIN — GABAPENTIN 300 MILLIGRAM(S): 400 CAPSULE ORAL at 20:36

## 2021-02-24 RX ADMIN — Medication 75 MICROGRAM(S): at 05:06

## 2021-02-24 RX ADMIN — Medication 150 MILLIGRAM(S): at 11:16

## 2021-02-24 RX ADMIN — Medication 6: at 11:15

## 2021-02-24 RX ADMIN — Medication 4 UNIT(S): at 16:14

## 2021-02-24 RX ADMIN — GABAPENTIN 300 MILLIGRAM(S): 400 CAPSULE ORAL at 15:04

## 2021-02-24 RX ADMIN — Medication 1 PATCH: at 11:13

## 2021-02-24 RX ADMIN — Medication 1 PATCH: at 11:16

## 2021-02-24 RX ADMIN — Medication 4 UNIT(S): at 11:16

## 2021-02-24 RX ADMIN — Medication 4 UNIT(S): at 09:08

## 2021-02-24 RX ADMIN — Medication 1 PATCH: at 20:51

## 2021-02-24 RX ADMIN — ENOXAPARIN SODIUM 40 MILLIGRAM(S): 100 INJECTION SUBCUTANEOUS at 20:36

## 2021-02-24 RX ADMIN — QUETIAPINE FUMARATE 50 MILLIGRAM(S): 200 TABLET, FILM COATED ORAL at 20:36

## 2021-02-24 RX ADMIN — Medication 2 MILLIGRAM(S): at 20:40

## 2021-02-24 RX ADMIN — GABAPENTIN 300 MILLIGRAM(S): 400 CAPSULE ORAL at 05:06

## 2021-02-24 NOTE — DIETITIAN INITIAL EVALUATION ADULT. - PERTINENT MEDS FT
MEDICATIONS  (STANDING):  dextrose 40% Gel 15 Gram(s) Oral once  dextrose 5%. 1000 milliLiter(s) (100 mL/Hr) IV Continuous <Continuous>  dextrose 5%. 1000 milliLiter(s) (50 mL/Hr) IV Continuous <Continuous>  dextrose 50% Injectable 25 Gram(s) IV Push once  dextrose 50% Injectable 25 Gram(s) IV Push once  dextrose 50% Injectable 12.5 Gram(s) IV Push once  doxazosin 2 milliGRAM(s) Oral at bedtime  enoxaparin Injectable 40 milliGRAM(s) SubCutaneous at bedtime  gabapentin 300 milliGRAM(s) Oral three times a day  glucagon  Injectable 1 milliGRAM(s) IntraMuscular once  insulin glargine Injectable (LANTUS) 12 Unit(s) SubCutaneous at bedtime  insulin lispro (ADMELOG) corrective regimen sliding scale   SubCutaneous at bedtime  insulin lispro (ADMELOG) corrective regimen sliding scale   SubCutaneous three times a day before meals  insulin lispro Injectable (ADMELOG) 4 Unit(s) SubCutaneous three times a day before meals  levothyroxine 75 MICROGram(s) Oral daily  nicotine -  14 mG/24Hr(s) Patch 1 patch Transdermal daily  QUEtiapine 50 milliGRAM(s) Oral at bedtime  venlafaxine XR. 150 milliGRAM(s) Oral daily    MEDICATIONS  (PRN):  calcium carbonate    500 mG (Tums) Chewable 2 Tablet(s) Chew two times a day PRN Heartburn  magnesium hydroxide Suspension 30 milliLiter(s) Oral daily PRN Constipation  nicotine  Polacrilex Gum 4 milliGRAM(s) Oral every 2 hours PRN cravings MEDICATIONS  (STANDING):  dextrose 40% Gel 15 Gram(s) Oral once  dextrose 5%. 1000 milliLiter(s) (100 mL/Hr) IV Continuous <Continuous>  dextrose 5%. 1000 milliLiter(s) (50 mL/Hr) IV Continuous <Continuous>  dextrose 50% Injectable 25 Gram(s) IV Push once  dextrose 50% Injectable 25 Gram(s) IV Push once  dextrose 50% Injectable 12.5 Gram(s) IV Push once  doxazosin 2 milliGRAM(s) Oral at bedtime  enoxaparin Injectable 40 milliGRAM(s) SubCutaneous at bedtime  gabapentin 300 milliGRAM(s) Oral three times a day  glucagon  Injectable 1 milliGRAM(s) IntraMuscular once  insulin glargine Injectable (LANTUS) 12 Unit(s) SubCutaneous at bedtime  insulin lispro (ADMELOG) corrective regimen sliding scale   SubCutaneous at bedtime  insulin lispro (ADMELOG) corrective regimen sliding scale   SubCutaneous three times a day before meals  insulin lispro Injectable (ADMELOG) 4 Unit(s) SubCutaneous three times a day before meals  levothyroxine 75 MICROGram(s) Oral daily  nicotine -  14 mG/24Hr(s) Patch 1 patch Transdermal daily  QUEtiapine 50 milliGRAM(s) Oral at bedtime  venlafaxine XR. 150 milliGRAM(s) Oral daily    MEDICATIONS  (PRN):  calcium carbonate    500 mG (Tums) Chewable 2 Tablet(s) Chew two times a day PRN Heartburn  magnesium hydroxide Suspension 30 milliLiter(s) Oral daily PRN Constipation  nicotine  Polacrilex Gum 4 milliGRAM(s) Oral every 2 hours PRN cravings  s/p thiamine & folic acid therapy x 1 day(02/22)

## 2021-02-24 NOTE — DIETITIAN INITIAL EVALUATION ADULT. - SIGNS/SYMPTOMS
denial need for diet/lifestyle changes, denial alcohol use PTA denial need for diet/lifestyle changes, denial alcohol use PTA, A1C=7.8%

## 2021-02-24 NOTE — CONSULT NOTE ADULT - PROBLEM SELECTOR RECOMMENDATION 9
possible poor nutrition and alcohol intake responsible for hypoglycemia.  Patient has normal renal function and hence has normal Renal Gluconeogenesis   short frequent meals and low glycemic index carbohydrates will help stabilize blood glucose and eliminate fluctuations

## 2021-02-24 NOTE — DIETITIAN INITIAL EVALUATION ADULT. - NS FNS REASON FOR WEIGHT CHANG
as per current wt., pt stated that she gained wt., did not state usual wt., stated that she was working out at the gym before COVID and that she may have lost muscle mass due to lack of activity/other (specify)

## 2021-02-24 NOTE — CONSULT NOTE ADULT - PROBLEM SELECTOR RECOMMENDATION 2
Continue with 75 g of levothyroxine. check thyroid function tests  and keep TSH around 2.0  Thank You for the courtesy of this consultation !!!

## 2021-02-24 NOTE — CONSULT NOTE ADULT - SUBJECTIVE AND OBJECTIVE BOX
Patient is a 50y old  Female who presents with a chief complaint of ETOH withdrawal and  hypoglycemia. (24 Feb 2021 16:40)      Reason For Consult: uncontrolled diabetes, hypoglycemia    HPI:  50 years old female Type I DM brought by ems as pt was unresponsive with blood sugar of 30. Patient received D 10 normal saline pt 's blood sugar at triage is 23 pt received dextro 50 1 amp iv pt then slowly woke up Pt is alert and oriented x 3.     In ER, patient admits to drinking alcohol yesterday and did not have dinner last meal was 17:00. Has a hx of Type I diabetes. She received no Lantus this AM only Humolog. Pt denies headache, dizziness, blurred visions, trauma to the head, light sensitivities, focal/distal weakness or numbness, neck/back/hips pain, cough, sob, chest pain, nausea, vomiting.     Admitted for IV glucose and blood sugar management.  (22 Feb 2021 13:33)  patient was admitted with alcohol withdrawal.  She is on Lantus and Humalog at home.  She possibly took her insulin but did not eat will not require much insulin/ diabetes medications upon discharge secondary to drinking.  Currently fingersticks a stable and in 100's.  Initially she had received dextrose water.  Currently stable and on 12 units of Lantus and 4 units of prandial lispro    PAST MEDICAL & SURGICAL HISTORY:  Raynaud disease    Hypothyroid    DM (diabetes mellitus)    ETOH abuse        FAMILY HISTORY:        Social History:    MEDICATIONS  (STANDING):  dextrose 40% Gel 15 Gram(s) Oral once  dextrose 5%. 1000 milliLiter(s) (100 mL/Hr) IV Continuous <Continuous>  dextrose 5%. 1000 milliLiter(s) (50 mL/Hr) IV Continuous <Continuous>  dextrose 50% Injectable 25 Gram(s) IV Push once  dextrose 50% Injectable 25 Gram(s) IV Push once  dextrose 50% Injectable 12.5 Gram(s) IV Push once  doxazosin 2 milliGRAM(s) Oral at bedtime  enoxaparin Injectable 40 milliGRAM(s) SubCutaneous at bedtime  gabapentin 300 milliGRAM(s) Oral three times a day  glucagon  Injectable 1 milliGRAM(s) IntraMuscular once  insulin glargine Injectable (LANTUS) 12 Unit(s) SubCutaneous at bedtime  insulin lispro (ADMELOG) corrective regimen sliding scale   SubCutaneous at bedtime  insulin lispro (ADMELOG) corrective regimen sliding scale   SubCutaneous three times a day before meals  insulin lispro Injectable (ADMELOG) 4 Unit(s) SubCutaneous three times a day before meals  levothyroxine 75 MICROGram(s) Oral daily  nicotine -  14 mG/24Hr(s) Patch 1 patch Transdermal daily  QUEtiapine 50 milliGRAM(s) Oral at bedtime  venlafaxine XR. 150 milliGRAM(s) Oral daily    MEDICATIONS  (PRN):  calcium carbonate    500 mG (Tums) Chewable 2 Tablet(s) Chew two times a day PRN Heartburn  magnesium hydroxide Suspension 30 milliLiter(s) Oral daily PRN Constipation  nicotine  Polacrilex Gum 4 milliGRAM(s) Oral every 2 hours PRN cravings      REVIEW OF SYSTEMS:  CONSTITUTIONAL:  as per HPI        T(C): 36.3 (02-24-21 @ 19:54), Max: 36.9 (02-24-21 @ 15:09)  HR: 76 (02-24-21 @ 19:54) (63 - 96)  BP: 109/76 (02-24-21 @ 19:54) (101/65 - 109/76)  RR: 18 (02-24-21 @ 19:54) (18 - 18)  SpO2: 97% (02-24-21 @ 19:54) (97% - 98%)  Wt(kg): --    PHYSICAL EXAM:  GENERAL: NAD, well-groomed, well-developed  HEAD:  Atraumatic, Normocephalic  EYES: EOMI, PERRLA, conjunctiva and sclera clear  ENMT: No tonsillar erythema, exudates, or enlargement; Moist mucous membranes, Good dentition, No lesions  NECK: Supple, No JVD, Normal thyroid  NERVOUS SYSTEM:  Alert & Oriented X3, Good concentration; Motor Strength 5/5 B/L upper and lower extremities; DTRs 2+ intact and symmetric  CHEST/LUNG: Clear to percussion bilaterally; No rales, rhonchi, wheezing, or rubs  HEART: Regular rate and rhythm; No murmurs, rubs, or gallops  ABDOMEN: Soft, Nontender, Nondistended; Bowel sounds present  EXTREMITIES:  2+ Peripheral Pulses, No clubbing, cyanosis, or edema  LYMPH: No lymphadenopathy noted  SKIN: No rashes or lesions    CAPILLARY BLOOD GLUCOSE      POCT Blood Glucose.: 134 mg/dL (24 Feb 2021 20:22)  POCT Blood Glucose.: 123 mg/dL (24 Feb 2021 16:13)  POCT Blood Glucose.: 257 mg/dL (24 Feb 2021 11:05)  POCT Blood Glucose.: 146 mg/dL (24 Feb 2021 08:08)  POCT Blood Glucose.: 173 mg/dL (23 Feb 2021 21:20)                            11.8   4.59  )-----------( 308      ( 24 Feb 2021 08:29 )             36.3       CMP:  02-24 @ 08:29  SGPT 21  Albumin 2.9   Alk Phos 46   Anion Gap 5   SGOT 17   Total Bili 0.4   BUN 11   Calcium Total 8.5   CO2 29   Chloride 109   Creatinine 0.74   eGFR if    eGFR if non AA 94   Glucose 133   Potassium 3.8   Protein 6.0   Sodium 143      Thyroid Function Tests:  02-23 @ 08:21 TSH 0.969 FreeT4 -- T3 -- Anti TPO -- Anti Thyroglobulin Ab -- TSI --      Diabetes Tests:     Parathyroids:     Adrenals:       Radiology:
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
## 2021-02-24 NOTE — DIETITIAN INITIAL EVALUATION ADULT. - OTHER INFO
Pt confirmed food insecurity due to recent stress, denied alcohol use.  As per ED note, pt reported alcohol use and skipping of dinner meal.   Pt c hx of DM x 40 years, was on insulin glargine and Humalog insulin as per insurance coverage.    Pt was not following up c Endocrinologist.   Pt self monitored blood glucose 6 x day and stated that she is aware of CHO controlled diet, blood glucose goal of A1C<7.0%.  Pt declined written education for CHO controlled diet, RD provided contact # for [facility] for food insecurity.   made aware of food insecurity.

## 2021-02-24 NOTE — DIETITIAN INITIAL EVALUATION ADULT. - PERTINENT LABORATORY DATA
02-24 Na143 mmol/L Glu 133 mg/dL<H> K+ 3.8 mmol/L Cr  0.74 mg/dL BUN 11 mg/dL 02-24 Phos 3.9 mg/dL 02-24 Alb 2.9 g/dL<L>02-24 ALT 21 U/L AST 17 U/L Alkaline Phosphatase 46 U/L  02-23-21 @ 11:42 a1c 7.8<H>  02/24 POCT blood Glucose 146 mg/dL  02/23 POCT blood Glucose range 78->190 mg/dL

## 2021-02-25 LAB
GLUCOSE BLDC GLUCOMTR-MCNC: 127 MG/DL — HIGH (ref 70–99)
GLUCOSE BLDC GLUCOMTR-MCNC: 127 MG/DL — HIGH (ref 70–99)
GLUCOSE BLDC GLUCOMTR-MCNC: 130 MG/DL — HIGH (ref 70–99)
GLUCOSE BLDC GLUCOMTR-MCNC: 139 MG/DL — HIGH (ref 70–99)

## 2021-02-25 PROCEDURE — 99232 SBSQ HOSP IP/OBS MODERATE 35: CPT

## 2021-02-25 RX ADMIN — Medication 4 UNIT(S): at 11:27

## 2021-02-25 RX ADMIN — GABAPENTIN 300 MILLIGRAM(S): 400 CAPSULE ORAL at 04:44

## 2021-02-25 RX ADMIN — Medication 4 MILLIGRAM(S): at 17:48

## 2021-02-25 RX ADMIN — Medication 1 PATCH: at 19:15

## 2021-02-25 RX ADMIN — Medication 4 MILLIGRAM(S): at 12:37

## 2021-02-25 RX ADMIN — Medication 4 MILLIGRAM(S): at 09:47

## 2021-02-25 RX ADMIN — Medication 150 MILLIGRAM(S): at 11:29

## 2021-02-25 RX ADMIN — Medication 4 UNIT(S): at 16:46

## 2021-02-25 RX ADMIN — Medication 4 MILLIGRAM(S): at 20:20

## 2021-02-25 RX ADMIN — Medication 1 PATCH: at 11:13

## 2021-02-25 RX ADMIN — Medication 1 PATCH: at 07:35

## 2021-02-25 RX ADMIN — Medication 4 UNIT(S): at 09:06

## 2021-02-25 RX ADMIN — Medication 75 MICROGRAM(S): at 04:45

## 2021-02-25 RX ADMIN — GABAPENTIN 300 MILLIGRAM(S): 400 CAPSULE ORAL at 20:35

## 2021-02-25 RX ADMIN — INSULIN GLARGINE 12 UNIT(S): 100 INJECTION, SOLUTION SUBCUTANEOUS at 20:35

## 2021-02-25 RX ADMIN — Medication 4 MILLIGRAM(S): at 14:37

## 2021-02-25 RX ADMIN — Medication 1 PATCH: at 11:28

## 2021-02-25 RX ADMIN — Medication 2 MILLIGRAM(S): at 20:35

## 2021-02-25 RX ADMIN — QUETIAPINE FUMARATE 50 MILLIGRAM(S): 200 TABLET, FILM COATED ORAL at 20:35

## 2021-02-25 RX ADMIN — ENOXAPARIN SODIUM 40 MILLIGRAM(S): 100 INJECTION SUBCUTANEOUS at 20:35

## 2021-02-25 RX ADMIN — GABAPENTIN 300 MILLIGRAM(S): 400 CAPSULE ORAL at 14:35
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
## 2021-02-26 ENCOUNTER — TRANSCRIPTION ENCOUNTER (OUTPATIENT)
Age: 51
End: 2021-02-26

## 2021-02-26 VITALS
OXYGEN SATURATION: 96 % | TEMPERATURE: 98 F | HEART RATE: 84 BPM | DIASTOLIC BLOOD PRESSURE: 68 MMHG | SYSTOLIC BLOOD PRESSURE: 95 MMHG | RESPIRATION RATE: 18 BRPM

## 2021-02-26 LAB
GLUCOSE BLDC GLUCOMTR-MCNC: 109 MG/DL — HIGH (ref 70–99)
GLUCOSE BLDC GLUCOMTR-MCNC: 187 MG/DL — HIGH (ref 70–99)
GLUCOSE BLDC GLUCOMTR-MCNC: 212 MG/DL — HIGH (ref 70–99)

## 2021-02-26 PROCEDURE — 99239 HOSP IP/OBS DSCHRG MGMT >30: CPT

## 2021-02-26 RX ORDER — INSULIN GLARGINE 100 [IU]/ML
12 INJECTION, SOLUTION SUBCUTANEOUS
Qty: 1 | Refills: 0
Start: 2021-02-26 | End: 2021-03-27

## 2021-02-26 RX ORDER — NICOTINE POLACRILEX 2 MG
1 GUM BUCCAL
Qty: 0 | Refills: 0 | DISCHARGE
Start: 2021-02-26

## 2021-02-26 RX ORDER — INSULIN LISPRO 100/ML
4 VIAL (ML) SUBCUTANEOUS
Qty: 1 | Refills: 0
Start: 2021-02-26 | End: 2021-03-27

## 2021-02-26 RX ADMIN — GABAPENTIN 300 MILLIGRAM(S): 400 CAPSULE ORAL at 05:09

## 2021-02-26 RX ADMIN — Medication 4 MILLIGRAM(S): at 08:53

## 2021-02-26 RX ADMIN — Medication 2: at 12:51

## 2021-02-26 RX ADMIN — Medication 4 MILLIGRAM(S): at 10:53

## 2021-02-26 RX ADMIN — Medication 150 MILLIGRAM(S): at 11:42

## 2021-02-26 RX ADMIN — Medication 1 PATCH: at 12:53

## 2021-02-26 RX ADMIN — Medication 4 UNIT(S): at 08:44

## 2021-02-26 RX ADMIN — Medication 1 PATCH: at 07:40

## 2021-02-26 RX ADMIN — Medication 75 MICROGRAM(S): at 05:09

## 2021-02-26 RX ADMIN — GABAPENTIN 300 MILLIGRAM(S): 400 CAPSULE ORAL at 15:05

## 2021-02-26 RX ADMIN — Medication 4 MILLIGRAM(S): at 13:23

## 2021-02-26 NOTE — PROGRESS NOTE ADULT - SUBJECTIVE AND OBJECTIVE BOX
Patient is a 50y old  Female who presents with a chief complaint of ETOH withdrawal and  hypoglycemia. (24 Feb 2021 12:07)      INTERVAL HPI/ OVERNIGHT EVENTS: Pt was seen and examined at bedside today, No significant overnight events, pt denies any complaints and feels back to her baseline      MEDICATIONS  (STANDING):  dextrose 40% Gel 15 Gram(s) Oral once  dextrose 5%. 1000 milliLiter(s) (100 mL/Hr) IV Continuous <Continuous>  dextrose 5%. 1000 milliLiter(s) (50 mL/Hr) IV Continuous <Continuous>  dextrose 50% Injectable 25 Gram(s) IV Push once  dextrose 50% Injectable 25 Gram(s) IV Push once  dextrose 50% Injectable 12.5 Gram(s) IV Push once  doxazosin 2 milliGRAM(s) Oral at bedtime  enoxaparin Injectable 40 milliGRAM(s) SubCutaneous at bedtime  gabapentin 300 milliGRAM(s) Oral three times a day  glucagon  Injectable 1 milliGRAM(s) IntraMuscular once  insulin glargine Injectable (LANTUS) 12 Unit(s) SubCutaneous at bedtime  insulin lispro (ADMELOG) corrective regimen sliding scale   SubCutaneous at bedtime  insulin lispro (ADMELOG) corrective regimen sliding scale   SubCutaneous three times a day before meals  insulin lispro Injectable (ADMELOG) 4 Unit(s) SubCutaneous three times a day before meals  levothyroxine 75 MICROGram(s) Oral daily  nicotine -  14 mG/24Hr(s) Patch 1 patch Transdermal daily  QUEtiapine 50 milliGRAM(s) Oral at bedtime  venlafaxine XR. 150 milliGRAM(s) Oral daily    MEDICATIONS  (PRN):  calcium carbonate    500 mG (Tums) Chewable 2 Tablet(s) Chew two times a day PRN Heartburn  magnesium hydroxide Suspension 30 milliLiter(s) Oral daily PRN Constipation  nicotine  Polacrilex Gum 4 milliGRAM(s) Oral every 2 hours PRN cravings      Allergies    ceftriaxone (Rash)    Intolerances        REVIEW OF SYSTEMS:    Unable to examine due to [ ] Encephalopathy [ ] Advanced Dementia [ ] Expressive Aphasia [ ] Non-verbal patient    CONSTITUTIONAL: No fever, NO generalized weakness/Fatigue, No weight loss  EYES: No eye pain, visual disturbances, or discharge  ENMT:  No difficulty hearing, tinnitus, vertigo; No sinus or throat pain  NECK: No pain or stiffness  RESPIRATORY: No shortness of breath,  cough, wheezing, sputum or hemoptysis   CARDIOVASCULAR: No chest pain, palpitations, or leg swelling  GASTROINTESTINAL: No abdominal pain. No nausea, vomiting, diarrhea or constipation. No melena or hematochezia.  GENITOURINARY: No dysuria, frequency, hematuria, or incontinence  NEUROLOGICAL: No headaches, Dizziness, memory loss, loss of strength, numbness, or tremors  SKIN: No itching, burning, rashes, or lesions   MUSCULOSKELETAL: No joint pain or swelling; No muscle, back, or extremity pain  PSYCHIATRIC: No depression, anxiety, mood swings, or difficulty sleeping  HEME/LYMPH: No easy bruising, or bleeding gums      Vital Signs Last 24 Hrs  T(C): 36.9 (24 Feb 2021 15:09), Max: 36.9 (24 Feb 2021 15:09)  T(F): 98.4 (24 Feb 2021 15:09), Max: 98.4 (24 Feb 2021 15:09)  HR: 81 (24 Feb 2021 15:09) (63 - 96)  BP: 108/57 (24 Feb 2021 15:09) (101/65 - 108/60)  BP(mean): --  RR: 18 (24 Feb 2021 15:09) (18 - 18)  SpO2: 97% (24 Feb 2021 15:09) (97% - 98%)    PHYSICAL EXAM:  GENERAL: NAD, well-developed, well-groomed  HEAD:  Atraumatic, Normocephalic  EYES: conjunctiva and sclera clear  ENMT: Moist mucous membranes  NECK: Supple, No JVD, Normal thyroid  CHEST/LUNG: Clear to Auscultation bilaterally; No rales, rhonchi, wheezing, or rubs  HEART: Regular rate and rhythm; No murmurs, rubs, or gallops  ABDOMEN: Soft, Nontender, Nondistended; Bowel sounds present  EXTREMITIES:  2+ Peripheral Pulses, No clubbing, cyanosis, or edema  SKIN: No rashes or lesions  NERVOUS SYSTEM:  Alert & Oriented X3, Good concentration; Motor Strength 5/5 B/L upper and lower extremities    LABS:                        11.8   4.59  )-----------( 308      ( 24 Feb 2021 08:29 )             36.3     02-24    143  |  109<H>  |  11  ----------------------------<  133<H>  3.8   |  29  |  0.74    Ca    8.5      24 Feb 2021 08:29  Phos  3.9     02-24  Mg     1.9     02-24    TPro  6.0  /  Alb  2.9<L>  /  TBili  0.4  /  DBili  x   /  AST  17  /  ALT  21  /  AlkPhos  46  02-24        CAPILLARY BLOOD GLUCOSE      POCT Blood Glucose.: 123 mg/dL (24 Feb 2021 16:13)  POCT Blood Glucose.: 257 mg/dL (24 Feb 2021 11:05)  POCT Blood Glucose.: 146 mg/dL (24 Feb 2021 08:08)  POCT Blood Glucose.: 173 mg/dL (23 Feb 2021 21:20)        Culture - Blood (collected 02-22-21)  Source: .Blood Blood-Peripheral  Preliminary Report (02-23-21):    No growth to date.    Culture - Blood (collected 02-22-21)  Source: .Blood Blood-Peripheral  Preliminary Report (02-23-21):    No growth to date.        RADIOLOGY & ADDITIONAL TESTS:          Imaging Personally Reviewed:  [ ] YES  [ ] NO    Consultant(s) Notes Reviewed:  [ ] YES  [ ] NO    Care Discussed with Consultants/Other Providers [x ] YES  [ ] NO
Patient is a 50y old  Female who presents with a chief complaint of ETOH withdrawal and  hypoglycemia. (25 Feb 2021 09:27)      INTERVAL HPI/ OVERNIGHT EVENTS: Pt was seen and examined at bedside today, No significant overnight events, pt denies any complaints.      MEDICATIONS  (STANDING):  dextrose 40% Gel 15 Gram(s) Oral once  dextrose 5%. 1000 milliLiter(s) (100 mL/Hr) IV Continuous <Continuous>  dextrose 5%. 1000 milliLiter(s) (50 mL/Hr) IV Continuous <Continuous>  dextrose 50% Injectable 25 Gram(s) IV Push once  dextrose 50% Injectable 25 Gram(s) IV Push once  dextrose 50% Injectable 12.5 Gram(s) IV Push once  doxazosin 2 milliGRAM(s) Oral at bedtime  enoxaparin Injectable 40 milliGRAM(s) SubCutaneous at bedtime  gabapentin 300 milliGRAM(s) Oral three times a day  glucagon  Injectable 1 milliGRAM(s) IntraMuscular once  insulin glargine Injectable (LANTUS) 12 Unit(s) SubCutaneous at bedtime  insulin lispro (ADMELOG) corrective regimen sliding scale   SubCutaneous at bedtime  insulin lispro (ADMELOG) corrective regimen sliding scale   SubCutaneous three times a day before meals  insulin lispro Injectable (ADMELOG) 4 Unit(s) SubCutaneous three times a day before meals  levothyroxine 75 MICROGram(s) Oral daily  nicotine -  14 mG/24Hr(s) Patch 1 patch Transdermal daily  QUEtiapine 50 milliGRAM(s) Oral at bedtime  venlafaxine XR. 150 milliGRAM(s) Oral daily    MEDICATIONS  (PRN):  calcium carbonate    500 mG (Tums) Chewable 2 Tablet(s) Chew two times a day PRN Heartburn  magnesium hydroxide Suspension 30 milliLiter(s) Oral daily PRN Constipation  nicotine  Polacrilex Gum 4 milliGRAM(s) Oral every 2 hours PRN cravings      Allergies    ceftriaxone (Rash)    Intolerances        REVIEW OF SYSTEMS:    Unable to examine due to [ ] Encephalopathy [ ] Advanced Dementia [ ] Expressive Aphasia [ ] Non-verbal patient    CONSTITUTIONAL: No fever, NO generalized weakness/Fatigue, No weight loss  EYES: No eye pain, visual disturbances, or discharge  ENMT:  No difficulty hearing, tinnitus, vertigo; No sinus or throat pain  NECK: No pain or stiffness  RESPIRATORY: No shortness of breath,  cough, wheezing, sputum or hemoptysis   CARDIOVASCULAR: No chest pain, palpitations, or leg swelling  GASTROINTESTINAL: No abdominal pain. No nausea, vomiting, diarrhea or constipation. No melena or hematochezia.  GENITOURINARY: No dysuria, frequency, hematuria, or incontinence  NEUROLOGICAL: No headaches, Dizziness, memory loss, loss of strength, numbness, or tremors  SKIN: No itching, burning, rashes, or lesions   MUSCULOSKELETAL: No joint pain or swelling; No muscle, back, or extremity pain  PSYCHIATRIC: No depression, anxiety, mood swings, or difficulty sleeping  HEME/LYMPH: No easy bruising, or bleeding gums      Vital Signs Last 24 Hrs  T(C): 36.3 (25 Feb 2021 10:25), Max: 36.4 (24 Feb 2021 23:30)  T(F): 97.3 (25 Feb 2021 10:25), Max: 97.5 (24 Feb 2021 23:30)  HR: 87 (25 Feb 2021 10:25) (71 - 98)  BP: 94/65 (25 Feb 2021 10:25) (94/65 - 112/81)  BP(mean): --  RR: 16 (25 Feb 2021 10:25) (16 - 18)  SpO2: 100% (25 Feb 2021 10:25) (97% - 100%)    PHYSICAL EXAM:  GENERAL: NAD, well-developed, well-groomed  HEAD:  Atraumatic, Normocephalic  EYES: conjunctiva and sclera clear  ENMT: Moist mucous membranes  NECK: Supple, No JVD, Normal thyroid  CHEST/LUNG: Clear to Auscultation bilaterally; No rales, rhonchi, wheezing, or rubs  HEART: Regular rate and rhythm; No murmurs, rubs, or gallops  ABDOMEN: Soft, Nontender, Nondistended; Bowel sounds present  EXTREMITIES:  2+ Peripheral Pulses, No clubbing, cyanosis, or edema  SKIN: No rashes or lesions  NERVOUS SYSTEM:  Alert & Oriented X3, Good concentration; Motor Strength 5/5 B/L upper and lower extremities    LABS:                        11.8   4.59  )-----------( 308      ( 24 Feb 2021 08:29 )             36.3     02-24    143  |  109<H>  |  11  ----------------------------<  133<H>  3.8   |  29  |  0.74    Ca    8.5      24 Feb 2021 08:29  Phos  3.9     02-24  Mg     1.9     02-24    TPro  6.0  /  Alb  2.9<L>  /  TBili  0.4  /  DBili  x   /  AST  17  /  ALT  21  /  AlkPhos  46  02-24        CAPILLARY BLOOD GLUCOSE      POCT Blood Glucose.: 127 mg/dL (25 Feb 2021 16:19)  POCT Blood Glucose.: 139 mg/dL (25 Feb 2021 11:10)  POCT Blood Glucose.: 127 mg/dL (25 Feb 2021 08:13)  POCT Blood Glucose.: 134 mg/dL (24 Feb 2021 20:22)        Culture - Blood (collected 02-22-21)  Source: .Blood Blood-Peripheral  Preliminary Report (02-23-21):    No growth to date.    Culture - Blood (collected 02-22-21)  Source: .Blood Blood-Peripheral  Preliminary Report (02-23-21):    No growth to date.        RADIOLOGY & ADDITIONAL TESTS:          Imaging Personally Reviewed:  [ ] YES  [ ] NO    Consultant(s) Notes Reviewed:  [x ] YES  [ ] NO    Care Discussed with Consultants/Other Providers [x ] YES  [ ] NO
HPI:  50 years old female Type I DM brought by ems as pt was unresponsive with blood sugar of 30. Patient received D 10 normal saline pt 's blood sugar at triage is 23 pt received dextro 50 1 amp iv pt then slowly woke up Pt is alert and oriented x 3.     In ER, patient admits to drinking alcohol yesterday and did not have dinner last meal was 17:00. Has a hx of Type I diabetes. She received no Lantus this AM only Humolog. Pt denies headache, dizziness, blurred visions, trauma to the head, light sensitivities, focal/distal weakness or numbness, neck/back/hips pain, cough, sob, chest pain, nausea, vomiting.     Admitted for IV glucose and blood sugar management.  (22 Feb 2021 13:33)      SUBJECTIVE & OBJECTIVE: Pt seen and examined at bedside.   no overnight events.   no complaints.     Denies chills, N/V, dizziness, HA, cough, CP, palpitations, SOB, abdominal pain, dysuria, diarrhea, constipation.     PHYSICAL EXAM:  T(C): 36.7 (02-23-21 @ 16:12), Max: 36.7 (02-23-21 @ 16:12)  HR: 74 (02-23-21 @ 16:12) (66 - 84)  BP: 115/71 (02-23-21 @ 16:12) (103/74 - 135/75)  RR: 18 (02-23-21 @ 16:12) (18 - 18)  SpO2: 98% (02-23-21 @ 16:12) (96% - 98%) on RA   Wt(kg): --   I&O's Detail    GENERAL: NAD, well-groomed, well-developed, no increased WOB   HEAD:  Atraumatic, Normocephalic  EYES: EOMI, PERRLA, conjunctiva and sclera clear  ENMT: Moist mucous membranes  NECK: Supple, No JVD  NERVOUS SYSTEM:  Alert & Oriented X3,no focal neuro deficits   CHEST/LUNG: CTAB  HEART: Regular rate and rhythm; No murmurs, rubs, or gallops  ABDOMEN: Soft, Nontender, Nondistended; Bowel sounds present  EXTREMITIES:  2+ Peripheral Pulses b/l, No clubbing, cyanosis, or edema b/l     MEDICATIONS  (STANDING):  dextrose 40% Gel 15 Gram(s) Oral once  dextrose 5%. 1000 milliLiter(s) (100 mL/Hr) IV Continuous <Continuous>  dextrose 5%. 1000 milliLiter(s) (50 mL/Hr) IV Continuous <Continuous>  dextrose 50% Injectable 25 Gram(s) IV Push once  dextrose 50% Injectable 25 Gram(s) IV Push once  dextrose 50% Injectable 12.5 Gram(s) IV Push once  doxazosin 2 milliGRAM(s) Oral at bedtime  gabapentin 300 milliGRAM(s) Oral three times a day  glucagon  Injectable 1 milliGRAM(s) IntraMuscular once  insulin glargine Injectable (LANTUS) 12 Unit(s) SubCutaneous at bedtime  insulin lispro (ADMELOG) corrective regimen sliding scale   SubCutaneous at bedtime  insulin lispro (ADMELOG) corrective regimen sliding scale   SubCutaneous three times a day before meals  insulin lispro Injectable (ADMELOG) 4 Unit(s) SubCutaneous three times a day before meals  levothyroxine 75 MICROGram(s) Oral daily  nicotine -  14 mG/24Hr(s) Patch 1 patch Transdermal daily  QUEtiapine 50 milliGRAM(s) Oral at bedtime  sodium chloride 0.9%. 1000 milliLiter(s) (100 mL/Hr) IV Continuous <Continuous>  venlafaxine XR. 150 milliGRAM(s) Oral daily    MEDICATIONS  (PRN):  calcium carbonate    500 mG (Tums) Chewable 2 Tablet(s) Chew two times a day PRN Heartburn  magnesium hydroxide Suspension 30 milliLiter(s) Oral daily PRN Constipation  nicotine  Polacrilex Gum 4 milliGRAM(s) Oral every 2 hours PRN cravings      LABS:                        11.7   5.43  )-----------( 326      ( 23 Feb 2021 10:26 )             36.4     02-23    141  |  110<H>  |  7   ----------------------------<  109<H>  4.4   |  27  |  0.83    Ca    8.3<L>      23 Feb 2021 10:26  Phos  2.7     02-23  Mg     1.6     02-23    TPro  6.1  /  Alb  3.0<L>  /  TBili  0.4  /  DBili  x   /  AST  22  /  ALT  23  /  AlkPhos  47  02-23    PT/INR - ( 22 Feb 2021 09:25 )   PT: 11.8 sec;   INR: 1.02 ratio         PTT - ( 22 Feb 2021 09:25 )  PTT:25.3 sec    Magnesium, Serum: 1.6 mg/dL (02-23 @ 10:26)    CAPILLARY BLOOD GLUCOSE      POCT Blood Glucose.: 154 mg/dL (23 Feb 2021 16:39)  POCT Blood Glucose.: 78 mg/dL (23 Feb 2021 11:06)  POCT Blood Glucose.: 190 mg/dL (23 Feb 2021 07:39)  POCT Blood Glucose.: 140 mg/dL (22 Feb 2021 22:32)  POCT Blood Glucose.: 91 mg/dL (22 Feb 2021 21:58)      CARDIAC MARKERS ( 22 Feb 2021 10:36 )  <.015 ng/mL / x     / x     / x     / x            RECENT CULTURES:        RADIOLOGY & ADDITIONAL TESTS:  < from: Xray Chest 1 View-PORTABLE IMMEDIATE (Xray Chest 1 View-PORTABLE IMMEDIATE .) (02.22.21 @ 10:43) >    PROCEDURE DATE:  02/22/2021          INTERPRETATION:  Chest portable.    Clinical History: Unresponsive.    Comparison: Unresponsive.    Single AP view submitted.    The evaluation of thecardiomediastinal silhouette is limited on portable technique.    No focal consolidation, effusion or pneumothorax is noted.    Impression:    No acute pulmonary process demonstrated.    < end of copied text >    < from: CT Head No Cont (02.22.21 @ 10:25) >  INTERPRETATION:  CLINICAL INFORMATION: unresponsive. . .    TECHNIQUE: Sequential axial images were obtained from the vertex to the skull base without intravenous contrast. Coronal and sagittal reformations were obtained.    COMPARISON: None .    FINDINGS:    There is no acute intracranial hemorrhage or mass effect. The ventricles and sulci are normal in size for patient's age.    There is no extraaxial fluid collection.    There is no displaced calvarial fracture. The visualized orbits are within normal limits. The visualized portions of the paranasal sinuses are well aerated. The mastoid air cells are well aerated.      IMPRESSION: No acute intracranial hemorrhage or mass effect. If clinically warranted, consider brain MRI if there is no contraindication.    < end of copied text >    < from: Xray Shoulder 2 Views, Left (02.22.21 @ 10:35) >  INTERPRETATION:  LEFT SHOULDER: AP, "Y" view    CLINICAL HISTORY: left shoulder pain    COMPARISON: None Available    FINDINGS:    Calcification adjacent to the superior lateral aspect of the humeral head is compatible with calcific tendinitis.    No acute fracture or dislocation is noted.    Impression:    Findings as discussed above.    < end of copied text >    EKG: NSR, prolonged QTc 515       Imaging Personally Reviewed:  [ ] YES  [ ] NO    Consultant(s) Notes Reviewed:  [x ] YES  [ ] NO    Care Discussed with Consultants/Other Providers [x ] YES  [ ] NO    Care discussed in detail with patient.  All questions and concerns addressed    
Patient is a 50y old  Female who presents with a chief complaint of ETOH withdrawal and  hypoglycemia. (24 Feb 2021 20:56)      INTERVAL HPI/OVERNIGHT EVENTS:  no events overnight  no further hypoglycemia  no complaints  good appetite    MEDICATIONS  (STANDING):  dextrose 40% Gel 15 Gram(s) Oral once  dextrose 5%. 1000 milliLiter(s) (100 mL/Hr) IV Continuous <Continuous>  dextrose 5%. 1000 milliLiter(s) (50 mL/Hr) IV Continuous <Continuous>  dextrose 50% Injectable 12.5 Gram(s) IV Push once  dextrose 50% Injectable 25 Gram(s) IV Push once  dextrose 50% Injectable 25 Gram(s) IV Push once  doxazosin 2 milliGRAM(s) Oral at bedtime  enoxaparin Injectable 40 milliGRAM(s) SubCutaneous at bedtime  gabapentin 300 milliGRAM(s) Oral three times a day  glucagon  Injectable 1 milliGRAM(s) IntraMuscular once  insulin glargine Injectable (LANTUS) 12 Unit(s) SubCutaneous at bedtime  insulin lispro (ADMELOG) corrective regimen sliding scale   SubCutaneous three times a day before meals  insulin lispro (ADMELOG) corrective regimen sliding scale   SubCutaneous at bedtime  insulin lispro Injectable (ADMELOG) 4 Unit(s) SubCutaneous three times a day before meals  levothyroxine 75 MICROGram(s) Oral daily  nicotine -  14 mG/24Hr(s) Patch 1 patch Transdermal daily  QUEtiapine 50 milliGRAM(s) Oral at bedtime  venlafaxine XR. 150 milliGRAM(s) Oral daily    MEDICATIONS  (PRN):  calcium carbonate    500 mG (Tums) Chewable 2 Tablet(s) Chew two times a day PRN Heartburn  magnesium hydroxide Suspension 30 milliLiter(s) Oral daily PRN Constipation  nicotine  Polacrilex Gum 4 milliGRAM(s) Oral every 2 hours PRN cravings      REVIEW OF SYSTEMS:  CONSTITUTIONAL: No fever, weight loss, or fatigue  RESPIRATORY: No cough, wheezing, chills or hemoptysis; No shortness of breath  CARDIOVASCULAR: No chest pain, palpitations, dizziness, or leg swelling  GASTROINTESTINAL: No abdominal or epigastric pain. No nausea, vomiting, or hematemesis; No diarrhea or constipation. No melena or hematochezia.  ENDOCRINE: No heat or cold intolerance; No hair loss      Vital Signs Last 24 Hrs  T(C): 36.4 (25 Feb 2021 04:31), Max: 36.9 (24 Feb 2021 15:09)  T(F): 97.5 (25 Feb 2021 04:31), Max: 98.4 (24 Feb 2021 15:09)  HR: 71 (25 Feb 2021 04:31) (71 - 98)  BP: 95/62 (25 Feb 2021 04:31) (95/62 - 112/81)  BP(mean): --  RR: 16 (25 Feb 2021 04:31) (16 - 18)  SpO2: 97% (25 Feb 2021 04:31) (97% - 98%)    PHYSICAL EXAM:  GENERAL: NAD, well-groomed, well-developed      LABS:                        11.8   4.59  )-----------( 308      ( 24 Feb 2021 08:29 )             36.3     02-24    143  |  109<H>  |  11  ----------------------------<  133<H>  3.8   |  29  |  0.74    Ca    8.5      24 Feb 2021 08:29  Phos  3.9     02-24  Mg     1.9     02-24    TPro  6.0  /  Alb  2.9<L>  /  TBili  0.4  /  DBili  x   /  AST  17  /  ALT  21  /  AlkPhos  46  02-24        CAPILLARY BLOOD GLUCOSE      POCT Blood Glucose.: 127 mg/dL (25 Feb 2021 08:13)  POCT Blood Glucose.: 134 mg/dL (24 Feb 2021 20:22)  POCT Blood Glucose.: 123 mg/dL (24 Feb 2021 16:13)  POCT Blood Glucose.: 257 mg/dL (24 Feb 2021 11:05)    Lipid panel:               RADIOLOGY & ADDITIONAL TESTS:  
Patient is a 50y old  Female who presents with a chief complaint of ETOH withdrawal and  hypoglycemia. (25 Feb 2021 16:57)      INTERVAL HPI/OVERNIGHT EVENTS:  no events overnight  NAD  no hypoglycemia  no complaints    MEDICATIONS  (STANDING):  dextrose 40% Gel 15 Gram(s) Oral once  dextrose 5%. 1000 milliLiter(s) (100 mL/Hr) IV Continuous <Continuous>  dextrose 5%. 1000 milliLiter(s) (50 mL/Hr) IV Continuous <Continuous>  dextrose 50% Injectable 12.5 Gram(s) IV Push once  dextrose 50% Injectable 25 Gram(s) IV Push once  dextrose 50% Injectable 25 Gram(s) IV Push once  doxazosin 2 milliGRAM(s) Oral at bedtime  enoxaparin Injectable 40 milliGRAM(s) SubCutaneous at bedtime  gabapentin 300 milliGRAM(s) Oral three times a day  glucagon  Injectable 1 milliGRAM(s) IntraMuscular once  insulin glargine Injectable (LANTUS) 12 Unit(s) SubCutaneous at bedtime  insulin lispro (ADMELOG) corrective regimen sliding scale   SubCutaneous three times a day before meals  insulin lispro (ADMELOG) corrective regimen sliding scale   SubCutaneous at bedtime  insulin lispro Injectable (ADMELOG) 4 Unit(s) SubCutaneous three times a day before meals  levothyroxine 75 MICROGram(s) Oral daily  nicotine -  14 mG/24Hr(s) Patch 1 patch Transdermal daily  QUEtiapine 50 milliGRAM(s) Oral at bedtime  venlafaxine XR. 150 milliGRAM(s) Oral daily    MEDICATIONS  (PRN):  calcium carbonate    500 mG (Tums) Chewable 2 Tablet(s) Chew two times a day PRN Heartburn  magnesium hydroxide Suspension 30 milliLiter(s) Oral daily PRN Constipation  nicotine  Polacrilex Gum 4 milliGRAM(s) Oral every 2 hours PRN cravings      REVIEW OF SYSTEMS:  CONSTITUTIONAL: No fever, weight loss, or fatigue  RESPIRATORY: No cough, wheezing, chills or hemoptysis; No shortness of breath  CARDIOVASCULAR: No chest pain, palpitations, dizziness, or leg swelling  GASTROINTESTINAL: No abdominal or epigastric pain. No nausea, vomiting, or hematemesis; No diarrhea or constipation. No melena or hematochezia.  ENDOCRINE: No heat or cold intolerance; No hair loss      Vital Signs Last 24 Hrs  T(C): 36.2 (26 Feb 2021 04:55), Max: 36.5 (25 Feb 2021 23:29)  T(F): 97.2 (26 Feb 2021 04:55), Max: 97.7 (25 Feb 2021 23:29)  HR: 75 (26 Feb 2021 04:55) (68 - 87)  BP: 110/75 (26 Feb 2021 04:55) (94/65 - 110/75)  BP(mean): --  RR: 17 (26 Feb 2021 04:55) (16 - 18)  SpO2: 97% (26 Feb 2021 04:55) (96% - 100%)    PHYSICAL EXAM:  GENERAL: NAD, well-groomed, well-developed        LABS:              CAPILLARY BLOOD GLUCOSE      POCT Blood Glucose.: 109 mg/dL (26 Feb 2021 07:50)  POCT Blood Glucose.: 130 mg/dL (25 Feb 2021 20:30)  POCT Blood Glucose.: 127 mg/dL (25 Feb 2021 16:19)  POCT Blood Glucose.: 139 mg/dL (25 Feb 2021 11:10)    Lipid panel:               RADIOLOGY & ADDITIONAL TESTS:

## 2021-02-26 NOTE — DISCHARGE NOTE PROVIDER - NSDCCPCAREPLAN_GEN_ALL_CORE_FT
PRINCIPAL DISCHARGE DIAGNOSIS  Diagnosis: Hypoglycemia  Assessment and Plan of Treatment: resolved/ Accidental Insulin Overdose    DM1, HgA1c 7.8%  continue with lantus 12u qhs, admelog 4u tid  Can follow up with PCP or Endocrine Dr. Watkins      SECONDARY DISCHARGE DIAGNOSES  Diagnosis: Acute metabolic encephalopathy  Assessment and Plan of Treatment: Secondary to Hypoglycemia   - Head CT: no acute changes   - TSH wnl, ETOH neg, Utox negative   Resolved with Hypoglycemia correction    Diagnosis: Hypothyroidism, unspecified type  Assessment and Plan of Treatment: continue with synthroid    Diagnosis: Post traumatic stress disorder (PTSD)  Assessment and Plan of Treatment: #PTSD/ MDD/  anxiety   continue with Seroquel, prazosin, Effexor    Diagnosis: Nicotine dependence  Assessment and Plan of Treatment: continue with Nicotine patch   Smoking Cessation is strongly recommended.    Diagnosis: Alcohol withdrawal  Assessment and Plan of Treatment: No withdrawals during admission   Avoid excessive alcohol consumption

## 2021-02-26 NOTE — PROGRESS NOTE ADULT - REASON FOR ADMISSION
ETOH withdrawal and  hypoglycemia.

## 2021-02-26 NOTE — DISCHARGE NOTE PROVIDER - NSDCMRMEDTOKEN_GEN_ALL_CORE_FT
Effexor: 185  orally once a day  gabapentin 300 mg oral tablet: orally 4 times a week  nicotine 14 mg/24 hr transdermal film, extended release: 1 patch transdermal once a day  prazosin 2 mg oral capsule: 1 cap(s) orally once a day (at bedtime)  SEROquel 50 mg oral tablet: orally once a day  Synthroid 75 mcg (0.075 mg) oral tablet: 1 tab(s) orally once a day   Effexor: 185  orally once a day  gabapentin 300 mg oral tablet: orally 4 times a week  insulin glargine: 12 unit(s) subcutaneous once a day (at bedtime)   insulin lispro 100 units/mL injectable solution: 4 unit(s) injectable 3 times a day (with meals)   nicotine 14 mg/24 hr transdermal film, extended release: 1 patch transdermal once a day  prazosin 2 mg oral capsule: 1 cap(s) orally once a day (at bedtime)  SEROquel 50 mg oral tablet: orally once a day  Synthroid 75 mcg (0.075 mg) oral tablet: 1 tab(s) orally once a day

## 2021-02-26 NOTE — DISCHARGE NOTE PROVIDER - CARE PROVIDER_API CALL
Clarisse Watkins (MD)  EndocrinologyMetabDiabetes; Internal Medicine  [address], [address]  [place] [address]  Phone: [phone]  Fax: [phone]  Follow Up Time:

## 2021-02-26 NOTE — PROGRESS NOTE ADULT - PROBLEM SELECTOR PLAN 1
resolved  while inpt continue lantus 12units, lispro 4untis with meals and ZAHEER with meals   upon d/c can resume home regimen  hypoglycemia prevention discussed  will f/u with her endocrinologist within 1wk (rigo)
resolved  while inpt continue lantus 12units, lispro 4untis with meals and ZAHEER with meals   upon d/c can resume home regimen  hypoglycemia prevention discussed  will f/u with her endocrinologist within 1wk (rigo)

## 2021-02-26 NOTE — DISCHARGE NOTE NURSING/CASE MANAGEMENT/SOCIAL WORK - PATIENT PORTAL LINK FT
You can access the FollowMyHealth Patient Portal offered by [facility] by registering at the following website: [email or web address]. By joining [employer]’s FollowMyHealth portal, you will also be able to view your health information using other applications (apps) compatible with our system.

## 2021-02-26 NOTE — DISCHARGE NOTE PROVIDER - HOSPITAL COURSE
50 years old female Type I DM brought by ems as pt was unresponsive with blood sugar of 30. Patient received D 10 normal saline pt 's blood sugar at triage is 23 pt received dextro 50 1 amp iv pt then slowly woke up Pt is alert and oriented x 3.     In ER, patient admits to drinking alcohol yesterday and did not have dinner last meal was 17:00. Has a hx of Type I diabetes. She received no Lantus this AM only Humolog. Pt denies headache, dizziness, blurred visions, trauma to the head, light sensitivities, focal/distal weakness or numbness, neck/back/hips pain, cough, sob, chest pain, nausea, vomiting.     Admitted for IV glucose and blood sugar management.     The patient was admitted to medical bed. Endocrine was consulted and followed the patient. Glucose levels are now controlled and mentation is back to baseline.

## 2021-02-27 LAB
CULTURE RESULTS: SIGNIFICANT CHANGE UP
CULTURE RESULTS: SIGNIFICANT CHANGE UP
SPECIMEN SOURCE: SIGNIFICANT CHANGE UP
SPECIMEN SOURCE: SIGNIFICANT CHANGE UP

## 2021-03-02 ENCOUNTER — EMERGENCY (EMERGENCY)
Facility: HOSPITAL | Age: 51
LOS: 1 days | Discharge: HOME | End: 2021-03-02
Attending: EMERGENCY MEDICINE | Admitting: EMERGENCY MEDICINE
Payer: MEDICAID

## 2021-03-02 VITALS
OXYGEN SATURATION: 100 % | DIASTOLIC BLOOD PRESSURE: 71 MMHG | HEART RATE: 95 BPM | RESPIRATION RATE: 16 BRPM | TEMPERATURE: 99 F | SYSTOLIC BLOOD PRESSURE: 110 MMHG

## 2021-03-02 PROCEDURE — 99284 EMERGENCY DEPT VISIT MOD MDM: CPT

## 2021-03-02 RX ADMIN — Medication 2 MILLIGRAM(S): at 20:07

## 2021-03-02 RX ADMIN — Medication 50 MILLIGRAM(S): at 20:30

## 2021-03-02 NOTE — ED PROVIDER NOTE - NSFOLLOWUPINSTRUCTIONS_ED_ALL_ED_FT
Alcohol intoxication is a harmful physical condition caused when you drink more alcohol than your body can handle. It is also called ethanol poisoning, or being drunk.    Call your local emergency number (911 in the [place]) if:  You have sudden trouble breathing or chest pain.  You have a seizure.  You feel sad enough to harm yourself or others.    Call your doctor if:  You have hallucinations (you see or hear things that are not real).  You cannot stop vomiting.  You have questions or concerns about your condition or care.    Recommended alcohol limits:  You should limit alcohol to 1 drink in a day. Do not have more than 3 drinks in 1 day or more than 7 in 1 week.    Manage alcohol use:  Decrease the amount you drink. This can help prevent health problems such as brain, heart, and liver damage, high blood pressure, diabetes, and cancer. If you cannot stop completely, healthcare providers can help you set goals to decrease the amount you drink.  Plan weekly alcohol use. You will be less likely to drink more than the recommended limit if you plan ahead.  Have food when you drink alcohol. Food will prevent alcohol from getting into your system too quickly. Eat before you have your first alcohol drink.  Time your drinks carefully. Have no more than 1 drink in an hour. Have a liquid such as water, coffee, or a soft drink between alcohol drinks.  Do not drive if you have had alcohol. Make sure someone who has not been drinking can help you get home.  Do not drink alcohol if you are taking medicine. Alcohol is dangerous when you combine it with certain medicines, such as acetaminophen or blood pressure medicine. Talk to your healthcare provider about all the medicines you currently take.    For more information:  Alcoholics Anonymous  Web Address: http://www.aa.org  Substance Abuse and Mental Health Services Administration  [address]  [place] [address]  Web Address: http://www.samhsa.gov

## 2021-03-02 NOTE — ED PROVIDER NOTE - PHYSICAL EXAMINATION
GENERAL: NAD, well appearing   EYES: EOMI, PERRL, pupils ~5mm  ENT: MMM, no oropharyngeal lesions or erythema appreciated, no tongue fasciculations   Pulm: normal work of breathing, CTABL  CV: RRR, S1&S2+, no m/r/g appreciated  ABDOMEN: soft, nt, nd, no hepatosplenomegaly  MSK: nl ROM  EXTREMITIES:  no appreciable edema in b/l LE  Neuro: A&Ox4 (person, place, year, month, day, persident), no focal deficits, no asterisks   SKIN: warm and dry, no visible rash
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
## 2021-03-02 NOTE — ED PROVIDER NOTE - PATIENT PORTAL LINK FT
You can access the FollowMyHealth Patient Portal offered by [facility] by registering at the following website: [email or web address]. By joining [employer]’s FollowMyHealth portal, you will also be able to view your health information using other applications (apps) compatible with our system. You can access the FollowMyHealth Patient Portal offered by [facility] by registering at the following website: [email or web address]. By joining [employer]’s FollowMyHealth portal, you will also be able to view your health information using other applications (apps) compatible with our system.
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
## 2021-03-02 NOTE — ED PROVIDER NOTE - NS ED ROS FT
CONSTITUTIONAL: No weakness, fevers or chills  EYES: No blindness, no eye pain   ENT:  No throat pain, No rhinorrhea   NECK: No pain or stiffness  RESPIRATORY: No cough, wheezing, hemoptysis; No shortness of breath  CARDIOVASCULAR: No chest pain or palpitations  GASTROINTESTINAL: No abdominal or epigastric pain. No nausea, vomiting, or diarrhea   GENITOURINARY: No dysuria, change in frequency or hematuria  NEUROLOGICAL: No numbness or weakness  SKIN: No itching, burning, rashes, or lesions   PSYCH: No depression or anxiety  All other review of systems is negative unless indicated above.
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
## 2021-03-02 NOTE — ED ADULT NURSE REASSESSMENT NOTE - NS ED NURSE REASSESS COMMENT FT1
Received from dayshift RN. Pt resting comfortably at this time offering no complaints. Fingerstick and vitals as per flowsheet.

## 2021-03-03 VITALS
DIASTOLIC BLOOD PRESSURE: 59 MMHG | HEART RATE: 78 BPM | OXYGEN SATURATION: 97 % | TEMPERATURE: 98 F | RESPIRATION RATE: 18 BRPM | SYSTOLIC BLOOD PRESSURE: 104 MMHG

## 2021-03-03 DIAGNOSIS — M75.32 CALCIFIC TENDINITIS OF LEFT SHOULDER: ICD-10-CM

## 2021-03-03 DIAGNOSIS — E11.40 TYPE 2 DIABETES MELLITUS WITH DIABETIC NEUROPATHY, UNSPECIFIED: ICD-10-CM

## 2021-03-03 DIAGNOSIS — F32.9 MAJOR DEPRESSIVE DISORDER, SINGLE EPISODE, UNSPECIFIED: ICD-10-CM

## 2021-03-03 DIAGNOSIS — F10.10 ALCOHOL ABUSE, UNCOMPLICATED: ICD-10-CM

## 2021-03-03 DIAGNOSIS — Z79.890 HORMONE REPLACEMENT THERAPY: ICD-10-CM

## 2021-03-03 DIAGNOSIS — T38.3X1A POISONING BY INSULIN AND ORAL HYPOGLYCEMIC [ANTIDIABETIC] DRUGS, ACCIDENTAL (UNINTENTIONAL), INITIAL ENCOUNTER: ICD-10-CM

## 2021-03-03 DIAGNOSIS — E10.649 TYPE 1 DIABETES MELLITUS WITH HYPOGLYCEMIA WITHOUT COMA: ICD-10-CM

## 2021-03-03 DIAGNOSIS — F41.9 ANXIETY DISORDER, UNSPECIFIED: ICD-10-CM

## 2021-03-03 DIAGNOSIS — G93.41 METABOLIC ENCEPHALOPATHY: ICD-10-CM

## 2021-03-03 DIAGNOSIS — Z59.0 HOMELESSNESS: ICD-10-CM

## 2021-03-03 DIAGNOSIS — Z72.0 TOBACCO USE: ICD-10-CM

## 2021-03-03 DIAGNOSIS — E03.9 HYPOTHYROIDISM, UNSPECIFIED: ICD-10-CM

## 2021-03-03 DIAGNOSIS — F43.10 POST-TRAUMATIC STRESS DISORDER, UNSPECIFIED: ICD-10-CM

## 2021-03-03 PROBLEM — I73.00 RAYNAUD'S SYNDROME WITHOUT GANGRENE: Chronic | Status: ACTIVE | Noted: 2021-02-22

## 2021-03-03 PROBLEM — E11.9 TYPE 2 DIABETES MELLITUS WITHOUT COMPLICATIONS: Chronic | Status: ACTIVE | Noted: 2021-02-22

## 2021-03-03 LAB — SARS-COV-2 RNA SPEC QL NAA+PROBE: SIGNIFICANT CHANGE UP

## 2021-03-03 SDOH — ECONOMIC STABILITY - HOUSING INSECURITY: HOMELESSNESS: Z59.0

## 2021-03-03 NOTE — PROVIDER CONTACT NOTE (OTHER) - ACTION/TREATMENT ORDERED:
Awaiting call back from [employer] re: taxi provider. Awaiting call back from [facility] re: taxi provider.  Addendum:  As per [facility], assigned taxi provider is [employer] [phone].  ETA 7:00 a.m.

## 2021-03-03 NOTE — PROVIDER CONTACT NOTE (OTHER) - SITUATION
Notified that pt needs transport home; chart review reports that pt resides at [facility] in [place]

## 2021-03-03 NOTE — PROVIDER CONTACT NOTE (OTHER) - ASSESSMENT
Pt has [place] [employer]; has taxi benefits through [employer].  SW contacted [facility] at [phone] to arrange taxi transport.  As per [facility], transport to be assigned.

## 2021-03-03 NOTE — ED ADULT NURSE REASSESSMENT NOTE - NS ED NURSE REASSESS COMMENT FT1
Pt refusing vitals at this time. Pt is agitated and yelling at staff members. Respirations even and unlabored at this time.

## 2021-06-09 ENCOUNTER — EMERGENCY (EMERGENCY)
Facility: HOSPITAL | Age: 51
LOS: 0 days | Discharge: LEFT BEFORE DISCHARGE | End: 2021-06-09
Attending: EMERGENCY MEDICINE
Payer: MEDICAID

## 2021-06-09 VITALS
HEART RATE: 83 BPM | RESPIRATION RATE: 16 BRPM | DIASTOLIC BLOOD PRESSURE: 83 MMHG | OXYGEN SATURATION: 95 % | SYSTOLIC BLOOD PRESSURE: 124 MMHG

## 2021-06-09 VITALS
HEART RATE: 91 BPM | HEIGHT: 63 IN | RESPIRATION RATE: 17 BRPM | WEIGHT: 149.91 LBS | DIASTOLIC BLOOD PRESSURE: 80 MMHG | TEMPERATURE: 98 F | OXYGEN SATURATION: 99 % | SYSTOLIC BLOOD PRESSURE: 118 MMHG

## 2021-06-09 DIAGNOSIS — F10.129 ALCOHOL ABUSE WITH INTOXICATION, UNSPECIFIED: ICD-10-CM

## 2021-06-09 DIAGNOSIS — Z88.1 ALLERGY STATUS TO OTHER ANTIBIOTIC AGENTS STATUS: ICD-10-CM

## 2021-06-09 DIAGNOSIS — T74.91XA UNSPECIFIED ADULT MALTREATMENT, CONFIRMED, INITIAL ENCOUNTER: ICD-10-CM
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
## 2021-06-09 DIAGNOSIS — Z79.4 LONG TERM (CURRENT) USE OF INSULIN: ICD-10-CM
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
## 2021-06-09 DIAGNOSIS — I73.00 RAYNAUD'S SYNDROME WITHOUT GANGRENE: ICD-10-CM

## 2021-06-09 DIAGNOSIS — E03.9 HYPOTHYROIDISM, UNSPECIFIED: ICD-10-CM

## 2021-06-09 DIAGNOSIS — E11.65 TYPE 2 DIABETES MELLITUS WITH HYPERGLYCEMIA: ICD-10-CM

## 2021-06-09 DIAGNOSIS — F10.10 ALCOHOL ABUSE, UNCOMPLICATED: ICD-10-CM

## 2021-06-09 PROBLEM — E11.9 TYPE 2 DIABETES MELLITUS WITHOUT COMPLICATIONS: Chronic | Status: ACTIVE | Noted: 2021-03-02

## 2021-06-09 LAB
ALBUMIN SERPL ELPH-MCNC: 3.4 G/DL — SIGNIFICANT CHANGE UP (ref 3.3–5)
ALP SERPL-CCNC: 48 U/L — SIGNIFICANT CHANGE UP (ref 40–120)
ALT FLD-CCNC: 40 U/L — SIGNIFICANT CHANGE UP (ref 12–78)
ANION GAP SERPL CALC-SCNC: 10 MMOL/L — SIGNIFICANT CHANGE UP (ref 5–17)
AST SERPL-CCNC: 38 U/L — HIGH (ref 15–37)
BASOPHILS # BLD AUTO: 0.09 K/UL — SIGNIFICANT CHANGE UP (ref 0–0.2)
BASOPHILS NFR BLD AUTO: 1.5 % — SIGNIFICANT CHANGE UP (ref 0–2)
BILIRUB SERPL-MCNC: 0.3 MG/DL — SIGNIFICANT CHANGE UP (ref 0.2–1.2)
BUN SERPL-MCNC: 14 MG/DL — SIGNIFICANT CHANGE UP (ref 7–23)
CALCIUM SERPL-MCNC: 8.2 MG/DL — LOW (ref 8.5–10.1)
CHLORIDE SERPL-SCNC: 107 MMOL/L — SIGNIFICANT CHANGE UP (ref 96–108)
CO2 SERPL-SCNC: 25 MMOL/L — SIGNIFICANT CHANGE UP (ref 22–31)
CREAT SERPL-MCNC: 0.79 MG/DL — SIGNIFICANT CHANGE UP (ref 0.5–1.3)
EOSINOPHIL # BLD AUTO: 0.19 K/UL — SIGNIFICANT CHANGE UP (ref 0–0.5)
EOSINOPHIL NFR BLD AUTO: 3.2 % — SIGNIFICANT CHANGE UP (ref 0–6)
ETHANOL SERPL-MCNC: 129 MG/DL — HIGH (ref 0–10)
GLUCOSE BLDC GLUCOMTR-MCNC: 247 MG/DL — HIGH (ref 70–99)
GLUCOSE BLDC GLUCOMTR-MCNC: 257 MG/DL — HIGH (ref 70–99)
GLUCOSE SERPL-MCNC: 270 MG/DL — HIGH (ref 70–99)
HCT VFR BLD CALC: 37.2 % — SIGNIFICANT CHANGE UP (ref 34.5–45)
HGB BLD-MCNC: 11.8 G/DL — SIGNIFICANT CHANGE UP (ref 11.5–15.5)
IMM GRANULOCYTES NFR BLD AUTO: 0.2 % — SIGNIFICANT CHANGE UP (ref 0–1.5)
LYMPHOCYTES # BLD AUTO: 0.95 K/UL — LOW (ref 1–3.3)
LYMPHOCYTES # BLD AUTO: 16 % — SIGNIFICANT CHANGE UP (ref 13–44)
MCHC RBC-ENTMCNC: 30.4 PG — SIGNIFICANT CHANGE UP (ref 27–34)
MCHC RBC-ENTMCNC: 31.7 GM/DL — LOW (ref 32–36)
MCV RBC AUTO: 95.9 FL — SIGNIFICANT CHANGE UP (ref 80–100)
MONOCYTES # BLD AUTO: 0.36 K/UL — SIGNIFICANT CHANGE UP (ref 0–0.9)
MONOCYTES NFR BLD AUTO: 6.1 % — SIGNIFICANT CHANGE UP (ref 2–14)
NEUTROPHILS # BLD AUTO: 4.34 K/UL — SIGNIFICANT CHANGE UP (ref 1.8–7.4)
NEUTROPHILS NFR BLD AUTO: 73 % — SIGNIFICANT CHANGE UP (ref 43–77)
NRBC # BLD: 0 /100 WBCS — SIGNIFICANT CHANGE UP (ref 0–0)
PLATELET # BLD AUTO: 305 K/UL — SIGNIFICANT CHANGE UP (ref 150–400)
POTASSIUM SERPL-MCNC: 4.3 MMOL/L — SIGNIFICANT CHANGE UP (ref 3.5–5.3)
POTASSIUM SERPL-SCNC: 4.3 MMOL/L — SIGNIFICANT CHANGE UP (ref 3.5–5.3)
PROT SERPL-MCNC: 6.5 GM/DL — SIGNIFICANT CHANGE UP (ref 6–8.3)
RBC # BLD: 3.88 M/UL — SIGNIFICANT CHANGE UP (ref 3.8–5.2)
RBC # FLD: 17.5 % — HIGH (ref 10.3–14.5)
SODIUM SERPL-SCNC: 142 MMOL/L — SIGNIFICANT CHANGE UP (ref 135–145)
WBC # BLD: 5.94 K/UL — SIGNIFICANT CHANGE UP (ref 3.8–10.5)
WBC # FLD AUTO: 5.94 K/UL — SIGNIFICANT CHANGE UP (ref 3.8–10.5)

## 2021-06-09 PROCEDURE — 99284 EMERGENCY DEPT VISIT MOD MDM: CPT

## 2021-06-09 RX ORDER — INSULIN HUMAN 100 [IU]/ML
5 INJECTION, SOLUTION SUBCUTANEOUS ONCE
Refills: 0 | Status: COMPLETED | OUTPATIENT
Start: 2021-06-09 | End: 2021-06-09

## 2021-06-09 RX ORDER — SODIUM CHLORIDE 9 MG/ML
1000 INJECTION INTRAMUSCULAR; INTRAVENOUS; SUBCUTANEOUS ONCE
Refills: 0 | Status: COMPLETED | OUTPATIENT
Start: 2021-06-09 | End: 2021-06-09

## 2021-06-09 RX ADMIN — SODIUM CHLORIDE 1000 MILLILITER(S): 9 INJECTION INTRAMUSCULAR; INTRAVENOUS; SUBCUTANEOUS at 05:45

## 2021-06-09 RX ADMIN — INSULIN HUMAN 5 UNIT(S): 100 INJECTION, SOLUTION SUBCUTANEOUS at 06:53

## 2021-06-09 NOTE — ED ADULT NURSE NOTE - PMH
Diabetes    DM (diabetes mellitus)    ETOH abuse    Hypothyroid    Hypothyroidism    Raynaud disease
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
## 2021-06-09 NOTE — SBIRT NOTE ADULT - NSSBIRTUNABLESCR_GEN_A_CORE
Pt stated she did not want to talk at this time. HC will reattempt./Patient uncooperative HC attempted two times- pt refused both./Patient refused
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
## 2021-06-09 NOTE — ED PEDIATRIC NURSE REASSESSMENT NOTE - NS ED NURSE REASSESS COMMENT FT2
Pt kept repeating she needed to go because her "personal belongings we're not safe." Pt seen ambulating with steady gait, pulled out her IV and left without being discharged. MD aware.

## 2021-06-09 NOTE — ED ADULT NURSE NOTE - OBJECTIVE STATEMENT
Patient was BIBA for intoxication. Patient admits to drinking a pint. Patient was walking with EMS with steady gait. Denies pain.

## 2021-06-09 NOTE — ED PROVIDER NOTE - PHYSICAL EXAMINATION
Gen: Alert, NAD, well appearing  Head: NC, AT, EOMI, normal lids/conjunctiva  ENT: normal hearing, patent oropharynx without erythema/exudate, uvula midline  Neck: +supple, no tenderness/meningismus/JVD, +Trachea midline  Pulm: Bilateral BS, normal resp effort, no wheeze/stridor/retractions  CV: RRR, no M/R/G, +dist pulses  Abd: soft, NT/ND, Negative [place] signs, +BS, no palpable masses  Mskel: no edema/erythema/cyanosis  Skin: no rash, warm/dry  Neuro: AAOx3, no apparent sensory/motor deficits, coordination intact

## 2021-06-09 NOTE — ED ADULT NURSE NOTE - PAIN RATING/NUMBER SCALE (0-10): REST
0 Partial Purse String (Intermediate) Text: Given the location of the defect and the characteristics of the surrounding skin an intermediate purse string closure was deemed most appropriate.  Undermining was performed circumferentially around the surgical defect.  A purse string suture was then placed and tightened. Wound tension of the circular defect prevented complete closure of the wound.

## 2022-08-03 NOTE — ED ADULT NURSE NOTE - MUSCULOSKELETAL ASSESSMENT
Pt complains of shortness of breath for several days. States she does have a history of COPD and does not have any inhalers at home.
- - -

## 2023-09-07 NOTE — ED PROVIDER NOTE - CCCP TRG CHIEF CMPLNT
"Diabetes Education    Patient Name:  Olga Sandoval  YOB: 1954  MRN: [id number]  Admit Date:  9/5/2023      Total time spent reviewing chart, preparing education/materials, providing education at bedside, and coordinating care approx 25 minutes.    Consult for diabetes education received per stroke protocol. Chart reviewed. Pt was seen at bedside today. Permission given for visit. Also present was pts daughter, who participated in discussion today.     Discussed and taught Ms. Sandoval about type 2 diabetes self-management, risk factors, and importance of blood glucose control to reduce complications. Target blood glucose readings and A1c goals per ADA were reviewed. Reviewed with patient current A1c 5.4 and discussed its significance. Pt and her daughter report pt has lost approx 50 pounds since starting ozempic, unable to recall when ozempic was started. Denies side effects from the ozempic besides decreased appetite and weight loss. Also takes metformin at home.     Signs, symptoms, and treatment of hyperglycemia and hypoglycemia were discussed. Pt reports she has occasional low blood sugars, but denies frequent episodes. She reports she treats with \"lifesavers\" and is aware that it is important to f/u with a snack. Reviewed rule of 15's and why it is important to treat this way. Pt reports she is more of a \"grazer\" and eats small amounts of food throughout the day. Discussed benefits of consistent eating pattern in helping stabilize blood sugar levels.     SMBG every other day, unless has symptoms or \"eats something I am not supposed to\". States she has had multiple strokes in the past. Discussed relationship between diabetes and stroke. Reviewed BEFAST.     Lifestyle changes such as physical activity with MD approval and healthy eating were encouraged. Stressed the importance of strict blood sugar control after surgery to prevent complications such as infection and to promote healing of incision. " intoxicated "Encouraged pt to monitor blood sugar at home 1x per day and to call PCP if blood sugar is trending high. Encouraged to keep record of blood glucose readings to take to follow up appointment with PCP. Provided patient with copy of [employer]'s \"Blood Glucose Goals\" handout, and \"What is A1c\" handout, as well as AHA handout regarding connection between diabetes and stroke.     Pt reports she has been told she did not have a stroke. Shared that she has been scheduled for Wed, Sept 27th at 1:30 pm. She declines follow-up stroke class appt. Explained how the follow-up is beneficial, especially in the setting of having hx of previous strokes. She states \"I will be home in [place] by then\". Provided outpatient office contact information and encouraged to call with any questions or concerns regarding her diabetes self-mgmt.  Pt mailed [facility] \"Diabetes Basics\" booklet to her home address for her review after discharge.     Thank you for this consult.     Electronically signed by:  Melissa Enrique RN  09/07/23 10:52 EDT  "
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
